# Patient Record
Sex: FEMALE | Race: WHITE | NOT HISPANIC OR LATINO | Employment: OTHER | URBAN - NONMETROPOLITAN AREA
[De-identification: names, ages, dates, MRNs, and addresses within clinical notes are randomized per-mention and may not be internally consistent; named-entity substitution may affect disease eponyms.]

---

## 2017-05-22 ENCOUNTER — HOSPITAL ENCOUNTER (EMERGENCY)
Facility: HOSPITAL | Age: 62
Discharge: HOME | End: 2017-05-22
Payer: COMMERCIAL

## 2017-05-22 VITALS
RESPIRATION RATE: 16 BRPM | OXYGEN SATURATION: 100 % | TEMPERATURE: 97.8 F | HEART RATE: 104 BPM | DIASTOLIC BLOOD PRESSURE: 87 MMHG | SYSTOLIC BLOOD PRESSURE: 138 MMHG

## 2017-05-22 DIAGNOSIS — L03.314 CELLULITIS OF GROIN, RIGHT: ICD-10-CM

## 2017-05-22 PROCEDURE — 99213 OFFICE O/P EST LOW 20 MIN: CPT

## 2017-05-22 RX ORDER — CEPHALEXIN 500 MG/1
500 CAPSULE ORAL 4 TIMES DAILY
Qty: 28 CAPSULE | Refills: 0 | Status: SHIPPED | OUTPATIENT
Start: 2017-05-22 | End: 2017-05-29

## 2017-05-22 ASSESSMENT — ENCOUNTER SYMPTOMS
SHORTNESS OF BREATH: 0
WOUND: 1
FEVER: 0
ABDOMINAL PAIN: 0
ROS SKIN COMMENTS: INSECT BITE

## 2017-05-22 NOTE — ED NOTES
"Patient presents with bug bite.  \"Possible spider.\"  Patient noticed yesterday and s/s of red, swollen and warm to the touch is spreading.  "

## 2017-05-22 NOTE — DISCHARGE INSTRUCTIONS
See attached for home care  Keep wound clean and dry  Cover bite laura with bacitracin ointment and band aid  Take all of antibiotic as prescribed  F/U with PCP if not improving or back to base line in 1 week  Return to  with worsening symptoms.         Discharge Instructions for Cellulitis  You have been diagnosed with cellulitis. This is an infection in the deepest layer of the skin. In some cases, the infection also affects the muscle. Cellulitis is caused by bacteria. The bacteria can enter the body through broken skin. This can happen with a cut, scratch, animal bite, or an insect bite that has been scratched. You may have been treated in the hospital with antibiotics and fluids. You will likely be given a prescription for antibiotics to take at home. This sheet will help you take care of yourself at home.  Home Care  When you are home:    Take the prescribed antibiotic medication you are given as directed until it is gone. Take it even if you feel better. It treats the infection and stops it from returning. Not taking all of the medication can make future infections hard to treat.    Keep the infected area clean.    When possible, raise the infected area above the level of your heart. This helps keep swelling down.    Talk to your doctor if you are in pain. Ask what kind of over-the-counter medication you can take for pain.    Apply clean bandages as advised.    Take your temperature once a day for a week.    Wash your hands often to prevent spreading the infection.  In the future, wash your hands before and after you touch cuts, scratches, or bandages. This will help prevent infection.   When to Call Your Doctor  Call your doctor immediately if you have any of the following:    Vomiting    Fever of100.4 F (38 C) or higher, or as directed by your health care provider    Shaking chills    Redness that gets worse in or around the infected area    Swelling of the infected area    Pain that gets worse in or  around the infected area    Difficulty or pain when moving the joints above or below the infected area    Discharge or pus draining from the area     6286-1450 [employer]. [address] [place] [address], [place] [address]. All rights reserved. This information is not intended as a substitute for professional medical care. Always follow your healthcare professional's instructions.

## 2017-05-22 NOTE — ED PROVIDER NOTES
"  History     Chief Complaint   Patient presents with     Insect Bite     HPI  Millie Gamez is a 62 year old female who presents to  for evaluation of insect bite. Pt states she noticed \"normal\" bite right lateral hip yesterday morning. She assumes spider but not sure. Erythema, induration worsened throughout the day, this morning spreading across groin and into lower abdomen. Denies fever, denies new arthralgias. Drainage noted at bite.     I have reviewed the Medications, Allergies, Past Medical and Surgical History, and Social History in the Epic system.    Review of Systems   Constitutional: Negative for fever.   Respiratory: Negative for shortness of breath.    Cardiovascular: Negative for chest pain.   Gastrointestinal: Negative for abdominal pain.   Skin: Positive for wound.        Insect bite   All other systems reviewed and are negative.      Physical Exam   BP: 138/87  Pulse: 104  Temp: 97.8  F (36.6  C)  Resp: 16  SpO2: 100 %  Physical Exam   Constitutional: She is oriented to person, place, and time. No distress.   HENT:   Head: Normocephalic.   Eyes: Conjunctivae are normal.   Neck: Normal range of motion.   Cardiovascular: Normal rate.    Pulmonary/Chest: Effort normal. No respiratory distress.   Abdominal: Soft. There is no tenderness.   Musculoskeletal: Normal range of motion.   Neurological: She is alert and oriented to person, place, and time.   Skin: Skin is warm and dry. She is not diaphoretic. There is erythema.        20 cm x 4 cm area of erythema, induration with center punctate draining yellow cloudy discharge.    Nursing note and vitals reviewed.      ED Course     Procedures      Assessments & Plan (with Medical Decision Making)   Pt presents with erythematous, indurated insect bite. Allergic reaction vs cellulitis. Given drainage at site, will treat with Keflex. Pt verbalizes understanding and agrees with plan.  Epic discharge instructions given. Pt discharged in stable condition. "     I have reviewed the nursing notes.    I have reviewed the findings, diagnosis, plan and need for follow up with the patient.    Discharge Medication List as of 5/22/2017 12:45 PM          Final diagnoses:   Cellulitis of groin, right     See attached for home care  Keep wound clean and dry  Cover bite laura with bacitracin ointment and band aid  Take all of antibiotic as prescribed  F/U with PCP if not improving or back to base line in 1 week  Return to  with worsening symptoms.     5/22/2017   [facility]     Elina Menezes APRN FNP  05/22/17 8908

## 2017-05-22 NOTE — ED AVS SNAPSHOT
[facility]    [address] [address]    Phone:  [phone]                                       Millie Gamez   MRN: [id number]    Department:  [facility]   Date of Visit:  5/22/2017           Patient Information     Date Of Birth          1955        Your diagnoses for this visit were:     Cellulitis of groin, right        You were seen by Elina Menezes APRN FNP.      Follow-up Information     Follow up with Anastacio Calderon MD In 1 week.    Specialty:  Family Practice    Why:  if not improving or back to baseline    Contact information:    [facility]  [address] [place] [address] [phone]          Follow up with [facility].    Specialty:  EMERGENCY MEDICINE    Why:  As needed, If symptoms worsen    Contact information:    [address] [address] [phone]    Additional information:    From [facility]: Take [address]. Turn left at [address]. Turn left at the first stoplight on [address]. At the first stop sign, take a right onto [address]. Take a left into the parking lot and continue through until you reach the North enterance of the building.       From [place]: Take [address]. Take the [address] ramp towards [place]. Turn left onto [address]. Take a slight right onto [address]. Turn left at the first stoplight on [address]. At the first stop sign, take a right onto [address]. Take a left into the parking lot and continue through until you reach the North enterance of the building.       From [place]: Take [address]. Take a right at [address]. At the first stop sign, take a right onto [address]. Take a left into the parking lot and continue through until you reach the North enterance of the building.         Discharge Instructions       See attached for home care  Keep wound clean and  dry  Cover bite laura with bacitracin ointment and band aid  Take all of antibiotic as prescribed  F/U with PCP if not improving or back to base line in 1 week  Return to  with worsening symptoms.         Discharge Instructions for Cellulitis  You have been diagnosed with cellulitis. This is an infection in the deepest layer of the skin. In some cases, the infection also affects the muscle. Cellulitis is caused by bacteria. The bacteria can enter the body through broken skin. This can happen with a cut, scratch, animal bite, or an insect bite that has been scratched. You may have been treated in the hospital with antibiotics and fluids. You will likely be given a prescription for antibiotics to take at home. This sheet will help you take care of yourself at home.  Home Care  When you are home:    Take the prescribed antibiotic medication you are given as directed until it is gone. Take it even if you feel better. It treats the infection and stops it from returning. Not taking all of the medication can make future infections hard to treat.    Keep the infected area clean.    When possible, raise the infected area above the level of your heart. This helps keep swelling down.    Talk to your doctor if you are in pain. Ask what kind of over-the-counter medication you can take for pain.    Apply clean bandages as advised.    Take your temperature once a day for a week.    Wash your hands often to prevent spreading the infection.  In the future, wash your hands before and after you touch cuts, scratches, or bandages. This will help prevent infection.   When to Call Your Doctor  Call your doctor immediately if you have any of the following:    Vomiting    Fever of100.4 F (38 C) or higher, or as directed by your health care provider    Shaking chills    Redness that gets worse in or around the infected area    Swelling of the infected area    Pain that gets worse in or around the infected area    Difficulty or pain when  moving the joints above or below the infected area    Discharge or pus draining from the area     5349-1269 [employer]. [address] [place] [address], [place] [address]. All rights reserved. This information is not intended as a substitute for professional medical care. Always follow your healthcare professional's instructions.             Review of your medicines      CONTINUE these medicines which may have CHANGED, or have new prescriptions. If we are uncertain of the size of tablets/capsules you have at home, strength may be listed as something that might have changed.        Dose / Directions Last dose taken    cephALEXin 500 MG capsule   Commonly known as:  KEFLEX   Dose:  500 mg   What changed:    - medication strength  - how much to take  - when to take this   Quantity:  28 capsule        Take 1 capsule (500 mg) by mouth 4 times daily for 7 days   Refills:  0                Prescriptions were sent or printed at these locations (1 Prescription)                   [facility] [address] [place] [address], [place] - [address] AT [facility] [address]   [address], [place] [address]    Telephone:  [phone]   Fax:  [phone]   Hours:                  E-Prescribed (1 of 1)         cephALEXin (KEFLEX) 500 MG capsule                Orders Needing Specimen Collection     None      Pending Results     No orders found from 5/20/2017 to 5/23/2017.            Pending Culture Results     No orders found from 5/20/2017 to 5/23/2017.            Thank you for choosing [facility]       Thank you for choosing [facility] for your care. Our goal is always to provide you with excellent care. Hearing back from our patients is one way we can continue to improve our services. Please take a few minutes to complete the written survey that you may receive in the mail after you visit with us. Thank you!        [facility] Information     [employer] lets you send messages to your doctor, view your test results, renew your  "prescriptions, schedule appointments and more. To sign up, go to [email or web address] . Click on \"Log in\" on the left side of the screen, which will take you to the Welcome page. Then click on \"Sign up Now\" on the right side of the page.     You will be asked to enter the access code listed below, as well as some personal information. Please follow the directions to create your username and password.     Your access code is: [email or web address]  Expires: 2017 12:45 PM     Your access code will  in 90 days. If you need help or a new code, please call your [facility] clinic or [phone].        Care EveryWhere ID     This is your Care EveryWhere ID. This could be used by other organizations to access your [facility] medical records  [id number]        After Visit Summary       This is your record. Keep this with you and show to your community pharmacist(s) and doctor(s) at your next visit.                  "

## 2017-05-22 NOTE — ED AVS SNAPSHOT
[facility]    [address] [place] [address]    Phone:  [phone]                                       Millie Gamez   MRN: [id number]    Department:  [facility]   Date of Visit:  5/22/2017           After Visit Summary Signature Page     I have received my discharge instructions, and my questions have been answered. I have discussed any challenges I see with this plan with the nurse or doctor.    ..........................................................................................................................................  Patient/Patient Representative Signature      ..........................................................................................................................................  Patient Representative Print Name and Relationship to Patient    ..................................................               ................................................  Date                                            Time    ..........................................................................................................................................  Reviewed by Signature/Title    ...................................................              ..............................................  Date                                                            Time

## 2017-05-27 ENCOUNTER — HOSPITAL ENCOUNTER (EMERGENCY)
Facility: HOSPITAL | Age: 62
Discharge: HOME | End: 2017-05-27
Attending: PHYSICIAN ASSISTANT | Admitting: PHYSICIAN ASSISTANT
Payer: COMMERCIAL

## 2017-05-27 VITALS
RESPIRATION RATE: 18 BRPM | DIASTOLIC BLOOD PRESSURE: 80 MMHG | TEMPERATURE: 97.7 F | SYSTOLIC BLOOD PRESSURE: 151 MMHG | OXYGEN SATURATION: 97 %

## 2017-05-27 DIAGNOSIS — S52.502A CLOSED FRACTURE OF DISTAL END OF LEFT RADIUS, UNSPECIFIED FRACTURE MORPHOLOGY, INITIAL ENCOUNTER: ICD-10-CM

## 2017-05-27 PROCEDURE — 29125 APPL SHORT ARM SPLINT STATIC: CPT

## 2017-05-27 PROCEDURE — 73110 X-RAY EXAM OF WRIST: CPT | Mod: TC,LT

## 2017-05-27 PROCEDURE — 40000268 ZZH STATISTIC NO CHARGES

## 2017-05-27 PROCEDURE — 29125 APPL SHORT ARM SPLINT STATIC: CPT | Performed by: PHYSICIAN ASSISTANT

## 2017-05-27 PROCEDURE — 99213 OFFICE O/P EST LOW 20 MIN: CPT

## 2017-05-27 RX ORDER — TRAMADOL HYDROCHLORIDE 50 MG/1
TABLET ORAL
Qty: 8 TABLET | Refills: 0 | Status: SHIPPED | OUTPATIENT
Start: 2017-05-27 | End: 2017-07-11

## 2017-05-27 ASSESSMENT — ENCOUNTER SYMPTOMS
JOINT SWELLING: 1
CONSTITUTIONAL NEGATIVE: 1
CARDIOVASCULAR NEGATIVE: 1
NEUROLOGICAL NEGATIVE: 1
PSYCHIATRIC NEGATIVE: 1
ARTHRALGIAS: 1

## 2017-05-27 NOTE — ED AVS SNAPSHOT
[facility]    [address] [address]    Phone:  [phone]                                       Millie Gamez   MRN: [id number]    Department:  [facility]   Date of Visit:  5/27/2017           Patient Information     Date Of Birth          1955        Your diagnoses for this visit were:     Closed fracture of distal end of left radius, unspecified fracture morphology, initial encounter        You were seen by Aleida Novak PA.      Follow-up Information     Follow up with Anastacio Calderon MD.    Specialty:  Family Practice    Why:  If symptoms worsen, prior to your Reevaluation at Orthopedics    Contact information:    [facility]  [address] [place] [id number] [phone]          Follow up with [facility].    Specialty:  EMERGENCY MEDICINE    Why:  If further concerns develop    Contact information:    [address] [place] [address] [phone]    Additional information:    From [facility]: Take [address]. Turn left at [address]. Turn left at the first stoplight on [address]. At the first stop sign, take a right onto [address]. Take a left into the parking lot and continue through until you reach the North enterance of the building.       From [place]: Take [address]. Take the [address] ramp towards [place]. Turn left onto [address]. Take a slight right onto [address]. Turn left at the first stoplight on [address]. At the first stop sign, take a right onto [address]. Take a left into the parking lot and continue through until you reach the North enterance of the building.       From [place]: Take [address]. Take a right at [address]. At the first stop sign, take a right onto [address]. Take a left into the parking lot and continue through until you reach the North enterance of the building.       Discharge  References/Attachments     RADIUS AND ULNA FRACTURE, NO REDUCTION REQUIRED (ENGLISH)    FRACTURE, UPPER EXTREMITY (ENGLISH)    SLING (ENGLISH)      ED Discharge Orders     ORTHOPEDICS ADULT REFERRAL       Your provider has referred you to: {ORTHOPEDICS ADULT REGION    Please be aware that coverage of these services is subject to the terms and limitations of your health insurance plan.  Call member services at your health plan with any benefit or coverage questions.      Please bring the following to your appointment:    >>   Any x-rays, CTs or MRIs which have been performed.  Contact the facility where they were done to arrange for  prior to your scheduled appointment.    >>   List of current medications   >>   This referral request   >>   Any documents/labs given to you for this referral                     Review of your medicines      START taking        Dose / Directions Last dose taken    traMADol 50 MG tablet   Commonly known as:  ULTRAM   Quantity:  8 tablet        Take half to one tablet every 8 hours as needed for pain   Refills:  0          Our records show that you are taking the medicines listed below. If these are incorrect, please call your family doctor or clinic.        Dose / Directions Last dose taken    cephALEXin 500 MG capsule   Commonly known as:  KEFLEX   Dose:  500 mg   Quantity:  28 capsule        Take 1 capsule (500 mg) by mouth 4 times daily for 7 days   Refills:  0                Prescriptions were sent or printed at these locations (1 Prescription)                   [facility] [id number]  [facility] [address] AT [facility] [address] [place] [address]    Telephone:  [phone]   Fax:  [phone]   Hours:                  Printed at Department/Unit printer (1 of 1)         traMADol (ULTRAM) 50 MG tablet                Procedures and tests performed during your visit     Wrist XR, G/E 3 views, left      Orders Needing Specimen Collection      "None      Pending Results     Date and Time Order Name Status Description    2017 1246 Wrist XR, G/E 3 views, left In process             Pending Culture Results     No orders found from 2017 to 2017.            Thank you for choosing [facility]       Thank you for choosing [facility] for your care. Our goal is always to provide you with excellent care. Hearing back from our patients is one way we can continue to improve our services. Please take a few minutes to complete the written survey that you may receive in the mail after you visit with us. Thank you!        [employer] Information     [employer] lets you send messages to your doctor, view your test results, renew your prescriptions, schedule appointments and more. To sign up, go to [email or web address] . Click on \"Log in\" on the left side of the screen, which will take you to the Welcome page. Then click on \"Sign up Now\" on the right side of the page.     You will be asked to enter the access code listed below, as well as some personal information. Please follow the directions to create your username and password.     Your access code is: [email or web address]  Expires: 2017 12:45 PM     Your access code will  in 90 days. If you need help or a new code, please call your [facility] clinic or [phone].        Care EveryWhere ID     This is your Care EveryWhere ID. This could be used by other organizations to access your [facility] medical records  [id number]        After Visit Summary       This is your record. Keep this with you and show to your community pharmacist(s) and doctor(s) at your next visit.                  "

## 2017-05-27 NOTE — ED AVS SNAPSHOT
[facility]    [address] [place] [address]    Phone:  [phone]                                       Millie Gamez   MRN: [id number]    Department:  [facility]   Date of Visit:  5/27/2017           After Visit Summary Signature Page     I have received my discharge instructions, and my questions have been answered. I have discussed any challenges I see with this plan with the nurse or doctor.    ..........................................................................................................................................  Patient/Patient Representative Signature      ..........................................................................................................................................  Patient Representative Print Name and Relationship to Patient    ..................................................               ................................................  Date                                            Time    ..........................................................................................................................................  Reviewed by Signature/Title    ...................................................              ..............................................  Date                                                            Time

## 2017-05-27 NOTE — ED PROVIDER NOTES
History     Chief Complaint   Patient presents with     Wrist Pain     lt wrist pain, notes fell last night     The history is provided by the patient.     Millie Gamez is a 62 year old female who states she fell last night with her hands out in front of her. Has left wrist pain/swelling. Denies any other injury at this time.     Allergies as of 05/27/2017     (No Known Allergies)     Discharge Medication List as of 5/27/2017  1:31 PM      START taking these medications    Details   traMADol (ULTRAM) 50 MG tablet Take half to one tablet every 8 hours as needed for pain, Disp-8 tablet, R-0, Local Print         CONTINUE these medications which have NOT CHANGED    Details   cephALEXin (KEFLEX) 500 MG capsule Take 1 capsule (500 mg) by mouth 4 times daily for 7 days, Disp-28 capsule, R-0, E-Prescribe           History reviewed. No pertinent past medical history.  Past Surgical History:   Procedure Laterality Date     COLONOSCOPY N/A 10/15/2015    Procedure: COLONOSCOPY;  Surgeon: Murray Calderon MD;  Location: [facility]     No family history on file.  Social History     Social History     Marital status:      Spouse name: N/A     Number of children: N/A     Years of education: N/A     Social History Main Topics     Smoking status: None     Smokeless tobacco: None     Alcohol use None     Drug use: None     Sexual activity: Not Asked     Other Topics Concern     None     Social History Narrative         I have reviewed the Medications, Allergies, Past Medical and Surgical History, and Social History in the Epic system.    Review of Systems   Constitutional: Negative.    HENT: Negative.    Cardiovascular: Negative.    Musculoskeletal: Positive for arthralgias and joint swelling.   Neurological: Negative.    Psychiatric/Behavioral: Negative.        Physical Exam   BP: 151/80  Heart Rate: 91  Temp: 97.7  F (36.5  C)  Resp: 18  SpO2: 97 %  Physical Exam   Constitutional: She is oriented to person, place, and  time. She appears well-developed and well-nourished. No distress.   Cardiovascular: Normal rate.    Pulmonary/Chest: Effort normal.   Musculoskeletal:   Left wrist: mild diffuse edema. No erythema/ecchymosis. Moderate radial side TTP. M/n/v intact. Pt guarding all ROM due to pain. 3/5 strength due to pain   Neurological: She is alert and oriented to person, place, and time.   Skin: She is not diaphoretic.   Psychiatric: She has a normal mood and affect.   Nursing note and vitals reviewed.      ED Course     ED Course     Orthopedic injury tx  Performed by: TELMA OWEN  Authorized by: TELMA OWEN   Consent: Verbal consent obtained.  Risks and benefits: risks, benefits and alternatives were discussed  Consent given by: patient  Patient identity confirmed: verbally with patient and provided demographic data  Injury location: wrist  Location details: left wrist  Injury type: fracture  Fracture type: distal radius  Pre-procedure neurovascular assessment: neurovascularly intact  Pre-procedure distal perfusion: normal  Pre-procedure neurological function: normal  Pre-procedure range of motion: reduced  Manipulation performed: no  Immobilization: splint and sling  Splint type: sugar tong  Supplies used: Ortho-Glass (with cottom sleeve, cotton padding, ace wrap and sling)  Post-procedure neurovascular assessment: post-procedure neurovascularly intact  Post-procedure distal perfusion: normal  Post-procedure neurological function: normal  Post-procedure range of motion: unchanged  Patient tolerance: Patient tolerated the procedure well with no immediate complications         Left wrist xray: vertical fracture in the distal radius, nondisplaced, pending official rad results            Assessments & Plan (with Medical Decision Making)     I have reviewed the nursing notes.    I have reviewed the findings, diagnosis, plan and need for follow up with the patient.    Discharge Medication List as of 5/27/2017  1:31 PM       START taking these medications    Details   traMADol (ULTRAM) 50 MG tablet Take half to one tablet every 8 hours as needed for pain, Disp-8 tablet, R-0, Local Print             Final diagnoses:   Closed fracture of distal end of left radius, unspecified fracture morphology, initial encounter           I discussed my tx plan with Dr. Blair. He agrees with my tx plan. Thank you for your assistance.  Patient verbally educated and given appropriate education sheets for the diagnoses and has no questions.  Follow up with your Primary Care provider if symptoms increase prior to your Orthopedic evaluation.  or if concerns develop, return to the ER  Aleida Novak Certified  Physician Assistant  5/27/2017  8:29 PM  URGENT CARE CLINIC      5/27/2017   [facility]     Aleida Novak PA  05/27/17 [phone]

## 2017-06-05 ENCOUNTER — OFFICE VISIT (OUTPATIENT)
Dept: ORTHOPEDICS | Facility: OTHER | Age: 62
End: 2017-06-05
Attending: ORTHOPAEDIC SURGERY
Payer: COMMERCIAL

## 2017-06-05 VITALS
HEART RATE: 101 BPM | HEIGHT: 66 IN | BODY MASS INDEX: 20.89 KG/M2 | SYSTOLIC BLOOD PRESSURE: 140 MMHG | WEIGHT: 130 LBS | TEMPERATURE: 99.1 F | OXYGEN SATURATION: 98 % | DIASTOLIC BLOOD PRESSURE: 78 MMHG

## 2017-06-05 DIAGNOSIS — S52.502A CLOSED FRACTURE OF DISTAL END OF LEFT RADIUS, UNSPECIFIED FRACTURE MORPHOLOGY, INITIAL ENCOUNTER: ICD-10-CM

## 2017-06-05 PROCEDURE — 25600 CLTX DST RDL FX/EPHYS SEP WO: CPT | Mod: LT

## 2017-06-05 PROCEDURE — 25600 CLTX DST RDL FX/EPHYS SEP WO: CPT | Mod: LT | Performed by: ORTHOPAEDIC SURGERY

## 2017-06-05 PROCEDURE — 99202 OFFICE O/P NEW SF 15 MIN: CPT | Mod: 57 | Performed by: ORTHOPAEDIC SURGERY

## 2017-06-05 PROCEDURE — 99212 OFFICE O/P EST SF 10 MIN: CPT

## 2017-06-05 RX ORDER — ACETAMINOPHEN 160 MG
2000 TABLET,DISINTEGRATING ORAL 2 TIMES DAILY
COMMUNITY

## 2017-06-05 RX ORDER — RIZATRIPTAN BENZOATE 10 MG/1
10 TABLET, ORALLY DISINTEGRATING ORAL
COMMUNITY
Start: 2016-09-30

## 2017-06-05 RX ORDER — CICLOPIROX 80 MG/ML
SOLUTION TOPICAL
COMMUNITY
Start: 2016-08-02

## 2017-06-05 RX ORDER — MULTIVITAMIN
CAPSULE ORAL
COMMUNITY

## 2017-06-05 ASSESSMENT — PAIN SCALES - GENERAL: PAINLEVEL: MILD PAIN (2)

## 2017-06-05 NOTE — MR AVS SNAPSHOT
"              After Visit Summary   2017    Millie Gamez    MRN: [id number]           Patient Information     Date Of Birth          1955        Visit Information        Provider Department      2017 2:00 PM Robert Lovell, DO  [facility]        Today's Diagnoses     Closed fracture of distal end of left radius, unspecified fracture morphology, initial encounter           Follow-ups after your visit        Who to contact     If you have questions or need follow up information about today's clinic visit or your schedule please contact  [facility]TH[facility] directly at [phone].  Normal or non-critical lab and imaging results will be communicated to you by [employer], letter or phone within 4 business days after the clinic has received the results. If you do not hear from us within 7 days, please contact the clinic through [employer] or phone. If you have a critical or abnormal lab result, we will notify you by phone as soon as possible.  Submit refill requests through Chat[employer] or call your pharmacy and they will forward the refill request to us. Please allow 3 business days for your refill to be completed.          Additional Information About Your Visit        MyChart Information     Chat[employer] lets you send messages to your doctor, view your test results, renew your prescriptions, schedule appointments and more. To sign up, go to [email or web address] . Click on \"Log in\" on the left side of the screen, which will take you to the Welcome page. Then click on \"Sign up Now\" on the right side of the page.     You will be asked to enter the access code listed below, as well as some personal information. Please follow the directions to create your username and password.     Your access code is: [email or web address]  Expires: 2017 12:45 PM     Your access code will  in 90 days. If you need help or a new code, please call your [place] clinic or [phone].        Care EveryWhere ID     This is your Care " "EveryWhere ID. This could be used by other organizations to access your [place] medical records  [id number]        Your Vitals Were     Pulse Temperature Height Pulse Oximetry BMI (Body Mass Index)       101 99.1  F (37.3  C) (Tympanic) 5' 6\" (1.676 m) 98% 20.98 kg/m2        Blood Pressure from Last 3 Encounters:   06/05/17 140/78   05/27/17 151/80   05/22/17 138/87    Weight from Last 3 Encounters:   06/05/17 130 lb (59 kg)              Today, you had the following     No orders found for display       Primary Care Provider Office Phone # Fax #    Anastacio Calderon [phone]       [place] [address] [place] [address]        Thank you!     Thank you for choosing  [facility]  for your care. Our goal is always to provide you with excellent care. Hearing back from our patients is one way we can continue to improve our services. Please take a few minutes to complete the written survey that you may receive in the mail after your visit with us. Thank you!             Your Updated Medication List - Protect others around you: Learn how to safely use, store and throw away your medicines at www.disposemymeds.org.          This list is accurate as of: 6/5/17  3:17 PM.  Always use your most recent med list.                   Brand Name Dispense Instructions for use    cephalexin 250 MG capsule    KEFLEX     TAKE 1 CAPSULE BY MOUTH EVERY DAY       ciclopirox 8 % Soln          denosumab 60 MG/ML Soln injection    PROLIA     Future order       Efinaconazole 10 % Soln      Apply 1 drop topically       MULTIvitamin  S Caps          rizatriptan 10 MG ODT tab    MAXALT-MLT     Take 10 mg by mouth       traMADol 50 MG tablet    ULTRAM    8 tablet    Take half to one tablet every 8 hours as needed for pain       vitamin D3 2000 UNITS Caps      Take 2,000 Units by mouth         "

## 2017-06-05 NOTE — NURSING NOTE
"Chief Complaint   Patient presents with     Musculoskeletal Problem     closed fracture of distal radius       Initial /78 (BP Location: Left arm, Patient Position: Chair, Cuff Size: Adult Regular)  Pulse 101  Temp 99.1  F (37.3  C) (Tympanic)  Ht 5' 6\" (1.676 m)  Wt 130 lb (59 kg)  SpO2 98%  BMI 20.98 kg/m2 Estimated body mass index is 20.98 kg/(m^2) as calculated from the following:    Height as of this encounter: 5' 6\" (1.676 m).    Weight as of this encounter: 130 lb (59 kg).  Medication Reconciliation: complete   Millie Sloan LPN      "

## 2017-06-05 NOTE — PROGRESS NOTES
Treatment of her left distal radius fracture.  The fracture was sustained approximately a week ago when she fell onto her outstretched wrist.  She was treated with a well-padded well molded sugar tong splint in the urgent care, and referred to orthopedics for definitive care.    Past medical history: She states she had a fracture of the same left distal radius and ulna when she was a child and is always have some residual deformity but always had full use of the wrist and hand.    Review of systems: Essentially unremarkable    Physical exam: Patient is an alert, oriented, very healthy-appearing woman in no apparent distress.  Her left arm is in a sugar tong splint.  The right upper extremity is completely normal full range of motion, normal neurovascular status normal strength.  The left upper extremity demonstrates swelling over the distal radius with slight amount of ecchymosis, sensation is intact motor exam demonstrates normal nerve function strength testing was not performed due to discomfort.  X-rays are reviewed and these demonstrated a comminuted intra-articular fracture of the distal radius with some displacement, but it is difficult to really assess as we do not have any x-rays of what her forearm looks like and how much of this deformity is new and how much of it is from her residual.    Assessment and plan the patient has a dye punch type lesion of her lunate fossa with some dorsal comminution.  There is considerable irregularity to the shape and anatomy of the distal radius.  Some of this appears to be old.  Treatment options included open reduction internal fixation, versus closed treatment.  The open reduction internal fixation would most likely have to be performed through a dorsal approach due to the residual deformity from her childhood fracture, of the volar aspect of the radius.  She has opted to proceed with closed treatment.  This will consist of a short arm cast for proximally another 4-6  "weeks.  This will be followed by occupational therapy, if needed, to restore range of motion and organization.  A well-padded well molded short arm cast was applied today and she will follow up in 2 weeks for repeat exam and x-ray or when necessary any difficulties in the interim./78 (BP Location: Left arm, Patient Position: Chair, Cuff Size: Adult Regular)  Pulse 101  Temp 99.1  F (37.3  C) (Tympanic)  Ht 5' 6\" (1.676 m)  Wt 130 lb (59 kg)  SpO2 98%  BMI 20.98 kg/m2  "

## 2017-06-13 DIAGNOSIS — S62.102A LEFT WRIST FRACTURE: Primary | ICD-10-CM

## 2017-06-21 ENCOUNTER — OFFICE VISIT (OUTPATIENT)
Dept: ORTHOPEDICS | Facility: OTHER | Age: 62
End: 2017-06-21
Attending: PHYSICIAN ASSISTANT
Payer: COMMERCIAL

## 2017-06-21 VITALS
BODY MASS INDEX: 20.89 KG/M2 | DIASTOLIC BLOOD PRESSURE: 66 MMHG | SYSTOLIC BLOOD PRESSURE: 104 MMHG | WEIGHT: 130 LBS | HEART RATE: 83 BPM | TEMPERATURE: 99.1 F | HEIGHT: 66 IN | OXYGEN SATURATION: 98 %

## 2017-06-21 DIAGNOSIS — S52.502D CLOSED FRACTURE OF DISTAL END OF LEFT RADIUS WITH ROUTINE HEALING, UNSPECIFIED FRACTURE MORPHOLOGY, SUBSEQUENT ENCOUNTER: Primary | ICD-10-CM

## 2017-06-21 PROCEDURE — 99212 OFFICE O/P EST SF 10 MIN: CPT

## 2017-06-21 PROCEDURE — 99207 ZZC FRACTURE CARE IN GLOBAL PERIOD: CPT | Performed by: PHYSICIAN ASSISTANT

## 2017-06-21 PROCEDURE — 73110 X-RAY EXAM OF WRIST: CPT | Mod: TC,LT

## 2017-06-21 RX ORDER — DICLOFENAC SODIUM 75 MG/1
75 TABLET, DELAYED RELEASE ORAL
COMMUNITY
Start: 2017-06-15 | End: 2017-09-22

## 2017-06-21 RX ORDER — SULFAMETHOXAZOLE/TRIMETHOPRIM 800-160 MG
TABLET ORAL
COMMUNITY
Start: 2017-06-15 | End: 2017-06-22

## 2017-06-21 ASSESSMENT — PAIN SCALES - GENERAL: PAINLEVEL: NO PAIN (0)

## 2017-06-21 NOTE — NURSING NOTE
"Chief Complaint   Patient presents with     RECHECK     Follow up left wrist fracture and new x-rays.       Initial /66 (BP Location: Left arm, Patient Position: Sitting, Cuff Size: Adult Regular)  Pulse 83  Temp 99.1  F (37.3  C) (Tympanic)  Ht 5' 6\" (1.676 m)  Wt 130 lb (59 kg)  SpO2 98%  BMI 20.98 kg/m2 Estimated body mass index is 20.98 kg/(m^2) as calculated from the following:    Height as of this encounter: 5' 6\" (1.676 m).    Weight as of this encounter: 130 lb (59 kg).  Medication Reconciliation: complete   Millie Sloan LPN      "

## 2017-06-21 NOTE — MR AVS SNAPSHOT
"              After Visit Summary   6/21/2017    Millie Gamez    MRN: [id number]           Patient Information     Date Of Birth          1955        Visit Information        Provider Department      6/21/2017 11:30 AM Pawel Haines PA-C  ORTH[facility]ED[facility]        Today's Diagnoses     Closed fracture of distal end of left radius with routine healing, unspecified fracture morphology, subsequent encounter    -  1      Care Instructions    Continue cast cares.  Follow up on 7/11/17 for recheck with cast off and repeat x-rays of the left wrist.          Follow-ups after your visit        Follow-up notes from your care team     Return in about 3 weeks (around 7/11/2017) for recheck left wrist, cast off then new x-rays.      Who to contact     If you have questions or need follow up information about today's clinic visit or your schedule please contact  ORTHOPEDICS directly at [phone].  Normal or non-critical lab and imaging results will be communicated to you by [employer], letter or phone within 4 business days after the clinic has received the results. If you do not hear from us within 7 days, please contact the clinic through [employer] or phone. If you have a critical or abnormal lab result, we will notify you by phone as soon as possible.  Submit refill requests through [employer] or call your pharmacy and they will forward the refill request to us. Please allow 3 business days for your refill to be completed.          Additional Information About Your Visit        MyChart Information     [employer] lets you send messages to your doctor, view your test results, renew your prescriptions, schedule appointments and more. To sign up, go to [email or web address] . Click on \"Log in\" on the left side of the screen, which will take you to the Welcome page. Then click on \"Sign up Now\" on the right side of the page.     You will be asked to enter the access code listed below, as well as some personal information. " "Please follow the directions to create your username and password.     Your access code is: [email or web address]  Expires: 2017 12:45 PM     Your access code will  in 90 days. If you need help or a new code, please call your [place] clinic or [phone].        Care EveryWhere ID     This is your Care EveryWhere ID. This could be used by other organizations to access your [place] medical records  [id number]        Your Vitals Were     Pulse Temperature Height Pulse Oximetry BMI (Body Mass Index)       83 99.1  F (37.3  C) (Tympanic) 5' 6\" (1.676 m) 98% 20.98 kg/m2        Blood Pressure from Last 3 Encounters:   17 104/66   17 140/78   17 151/80    Weight from Last 3 Encounters:   17 130 lb (59 kg)   17 130 lb (59 kg)               Primary Care Provider Office Phone # Fax #    Anastacio Calderon [phone]       [facility] [address] [place] [address]        Equal Access to Services     [facility] AH: Hadii aad ku hadasho Soanalisaali, waaxda luqadaha, qaybta kaalmada adebernardayada, giorgio [place]en[place] . So [facility] [phone].    ATENCIÓN: Si habla español, tiene a hamilton disposición servicios gratuitos de asistencia lingüística. Llame al [phone].    We comply with applicable federal civil rights laws and [place] laws. We do not discriminate on the basis of race, color, national origin, age, disability sex, sexual orientation or gender identity.            Thank you!     Thank you for choosing  [facility]  for your care. Our goal is always to provide you with excellent care. Hearing back from our patients is one way we can continue to improve our services. Please take a few minutes to complete the written survey that you may receive in the mail after your visit with us. Thank you!             Your Updated Medication List - Protect others around you: Learn how to safely use, store and throw away your medicines at [email or web address]. "          This list is accurate as of: 6/21/17 11:34 AM.  Always use your most recent med list.                   Brand Name Dispense Instructions for use Diagnosis    cephalexin 250 MG capsule    KEFLEX     TAKE 1 CAPSULE BY MOUTH EVERY DAY        ciclopirox 8 % Soln           denosumab 60 MG/ML Soln injection    PROLIA     Future order        diclofenac 75 MG EC tablet    VOLTAREN     Take 75 mg by mouth        Efinaconazole 10 % Soln      Apply 1 drop topically        MULTIvitamin  S Caps           rizatriptan 10 MG ODT tab    MAXALT-MLT     Take 10 mg by mouth        sulfamethoxazole-trimethoprim 800-160 MG per tablet    BACTRIM DS/SEPTRA DS          traMADol 50 MG tablet    ULTRAM    8 tablet    Take half to one tablet every 8 hours as needed for pain        vitamin D3 2000 UNITS Caps      Take 2,000 Units by mouth

## 2017-06-21 NOTE — PROGRESS NOTES
"CHIEF COMPLAINT:  Recheck displaced intra-articular left distal radius fracture, DOI 5/27/17    SUBJECTIVE:  Millie Gamez is a 62 year old female who is here today for follow up of the above noted fracture on DOI noted.  I am seeing her in Dr. Lovell's absence.  She is now 3 1/2 weeks out from injury.  She is here today for serial x-ray check in the cast.  She has tolerated her cast well, kept it clean and dry.  She has essentially no discomfort.  Patient denies numbness or tingling.  Patient denies other new events or injuries.      OBJECTIVE:   Blood pressure 104/66, pulse 83, temperature 99.1  F (37.3  C), temperature source Tympanic, height 5' 6\" (1.676 m), weight 130 lb (59 kg), SpO2 98 %.   Upon inspection, cast is in fair repair.  Skin appears clean dry and intact surrounding the cast.  No appreciable digital edema.  Digital ROM full.  Distal neurovascular status intact.      IMAGING:  New x-rays of the left wrist were obtained today, interpreted and reviewed.  There has been no interval change in overall alignment/displacement/angulation when compared to previous films on 5/27/17.  There appears to be early healing response however cast obscures fine detail.  No other obvious bony or soft tissue abnormalities.    ASSESSMENT:   Stable recheck displaced intra-articular left distal radius fracture, DOI 5/27/17    PLAN:   Will continue current plan of care.  She will stay in cast until 6 weeks from DOI.  Cast cares again discussed.  She will follow up on 7/11/17 for recheck with cast off and new x-rays.  Patient will return sooner if worsening pain, other issues or concerns.      Pawel Haines PA-C        "

## 2017-06-21 NOTE — PATIENT INSTRUCTIONS
Continue cast cares.  Follow up on 7/11/17 for recheck with cast off and repeat x-rays of the left wrist.

## 2017-07-11 ENCOUNTER — APPOINTMENT (OUTPATIENT)
Dept: GENERAL RADIOLOGY | Facility: OTHER | Age: 62
End: 2017-07-11
Attending: PHYSICIAN ASSISTANT
Payer: COMMERCIAL

## 2017-07-11 ENCOUNTER — OFFICE VISIT (OUTPATIENT)
Dept: ORTHOPEDICS | Facility: OTHER | Age: 62
End: 2017-07-11
Attending: PHYSICIAN ASSISTANT
Payer: COMMERCIAL

## 2017-07-11 VITALS
OXYGEN SATURATION: 98 % | HEART RATE: 98 BPM | DIASTOLIC BLOOD PRESSURE: 78 MMHG | HEIGHT: 66 IN | TEMPERATURE: 98.5 F | SYSTOLIC BLOOD PRESSURE: 128 MMHG | BODY MASS INDEX: 20.89 KG/M2 | WEIGHT: 130 LBS

## 2017-07-11 DIAGNOSIS — S52.502D CLOSED FRACTURE OF DISTAL END OF LEFT RADIUS WITH ROUTINE HEALING, UNSPECIFIED FRACTURE MORPHOLOGY, SUBSEQUENT ENCOUNTER: Primary | ICD-10-CM

## 2017-07-11 PROCEDURE — 99212 OFFICE O/P EST SF 10 MIN: CPT

## 2017-07-11 PROCEDURE — 73100 X-RAY EXAM OF WRIST: CPT | Mod: TC,LT | Performed by: RADIOLOGY

## 2017-07-11 PROCEDURE — 73100 X-RAY EXAM OF WRIST: CPT | Mod: TC,LT

## 2017-07-11 PROCEDURE — 99207 ZZC FRACTURE CARE IN GLOBAL PERIOD: CPT | Performed by: PHYSICIAN ASSISTANT

## 2017-07-11 ASSESSMENT — PAIN SCALES - GENERAL: PAINLEVEL: NO PAIN (0)

## 2017-07-11 NOTE — NURSING NOTE
"Chief Complaint   Patient presents with     RECHECK     Cast off and follow up left wrist fracture.       Initial /78 (BP Location: Left arm, Patient Position: Sitting, Cuff Size: Adult Regular)  Pulse 98  Temp 98.5  F (36.9  C) (Tympanic)  Ht 5' 6\" (1.676 m)  Wt 130 lb (59 kg)  SpO2 98%  BMI 20.98 kg/m2 Estimated body mass index is 20.98 kg/(m^2) as calculated from the following:    Height as of this encounter: 5' 6\" (1.676 m).    Weight as of this encounter: 130 lb (59 kg).  Medication Reconciliation: complete   Millie Sloan LPN      "

## 2017-07-11 NOTE — PROGRESS NOTES
"CHIEF COMPLAINT:  Recheck displaced intra-articular left distal radius fracture, DOI 5/27/17     SUBJECTIVE:  Millie Gamez is a 62 year old female who is here today for follow up of the above noted fracture on DOI noted.  She is now 6 1/2 weeks out from injury.  She is here today for routine recheck.  She has tolerated her cast well, kept it clean and dry.  She has essentially no discomfort while in cast.  She is having some numbness and tingling in a median nerve distribution which she also had pre-injury.  Patient denies other new events or injuries.       OBJECTIVE:   /78 (BP Location: Left arm, Patient Position: Sitting, Cuff Size: Adult Regular)  Pulse 98  Temp 98.5  F (36.9  C) (Tympanic)  Ht 5' 6\" (1.676 m)  Wt 130 lb (59 kg)  SpO2 98%  BMI 20.98 kg/m2  Upon inspection, cast is in fair repair.  Cast removed.  Skin appears clean dry and intact throughout forearm wrist and hand.  No appreciable edema.  She is nontender along the distal radius and ulna today.  Wrist ROM limited as expected.  Digital ROM full.  Negative Tinel's.  Equivocal compression test.  Distal neurovascular status intact.       IMAGING:  New x-rays of the left wrist were obtained today, interpreted and reviewed.  There has been no interval change in overall alignment/displacement/angulation when compared to previous films on 6/21/17.  There has been progressive healing response with sclerosis and callus noted.  No other obvious bony or soft tissue abnormalities.     ASSESSMENT:   Stable recheck healing displaced intra-articular left distal radius fracture, DOI 5/27/17.  Question underlying CTS.     PLAN:   Will discontinue immobilization and allow her to begin gentle ROM exercises.  She may slowly resume use of the left arm as tolerated.  Did recommend against any heavy lifting or resisted use for another few weeks.  Advised she follow up in 4 weeks for recheck, sooner if worsening pain, other issues or concerns.  No x-rays " needed at next visit unless patient having discomfort.  All questions answered.        Pawel Haines PA-C

## 2017-07-11 NOTE — PATIENT INSTRUCTIONS
Begin ROM exercises as discussed.  May slowly resume use with left arm as you feel comfortable.  No heavy lifting or resisted use for another few weeks.    Follow up in 4 weeks for recheck.

## 2017-07-11 NOTE — MR AVS SNAPSHOT
"              After Visit Summary   7/11/2017    Millie Gamez    MRN: [id number]           Patient Information     Date Of Birth          1955        Visit Information        Provider Department      7/11/2017 11:30 AM Pawel Haines PA-C  ORTH[facility]EDICS        Today's Diagnoses     Closed fracture of distal end of left radius with routine healing, unspecified fracture morphology, subsequent encounter    -  1      Care Instructions    Begin ROM exercises as discussed.  May slowly resume use with left arm as you feel comfortable.  No heavy lifting or resisted use for another few weeks.    Follow up in 4 weeks for recheck.            Follow-ups after your visit        Follow-up notes from your care team     Return in about 4 weeks (around 8/8/2017) for recheck left wrist.      Who to contact     If you have questions or need follow up information about today's clinic visit or your schedule please contact  ORTHOPEDICS directly at [phone].  Normal or non-critical lab and imaging results will be communicated to you by [employer], letter or phone within 4 business days after the clinic has received the results. If you do not hear from us within 7 days, please contact the clinic through [employer] or phone. If you have a critical or abnormal lab result, we will notify you by phone as soon as possible.  Submit refill requests through [facility] or call your pharmacy and they will forward the refill request to us. Please allow 3 business days for your refill to be completed.          Additional Information About Your Visit        [employer] Information     [facility] lets you send messages to your doctor, view your test results, renew your prescriptions, schedule appointments and more. To sign up, go to [email or web address] . Click on \"Log in\" on the left side of the screen, which will take you to the Welcome page. Then click on \"Sign up Now\" on the right side of the page.     You will be asked to enter the access " "code listed below, as well as some personal information. Please follow the directions to create your username and password.     Your access code is: [email or web address]  Expires: 2017 12:45 PM     Your access code will  in 90 days. If you need help or a new code, please call your [facility] or [phone].        Care EveryWhere ID     This is your Care EveryWhere ID. This could be used by other organizations to access your [place] medical records  [id number]        Your Vitals Were     Pulse Temperature Height Pulse Oximetry BMI (Body Mass Index)       98 98.5  F (36.9  C) (Tympanic) 5' 6\" (1.676 m) 98% 20.98 kg/m2        Blood Pressure from Last 3 Encounters:   17 128/78   17 104/66   17 140/78    Weight from Last 3 Encounters:   17 130 lb (59 kg)   17 130 lb (59 kg)   17 130 lb (59 kg)              Today, you had the following     No orders found for display       Primary Care Provider Office Phone # Fax #    Anastacio Calderon [phone]       [facility] [address] [place] [address]        Equal Access to Services     THOMAS [facility] HospitalFRANCISCA : Hadii cassidy plasenciao Soanalisaali, waaxda luqadaha, qaybta kaalmada adeegyada, giorgio montenegro . So [facility] [phone].    ATENCIÓN: Si habla español, tiene a hamilton disposición servicios gratuitos de asistencia lingüística. Llame al [phone].    We comply with applicable federal civil rights laws and [place] laws. We do not discriminate on the basis of race, color, national origin, age, disability sex, sexual orientation or gender identity.            Thank you!     Thank you for choosing  ORTHOPEDICS  for your care. Our goal is always to provide you with excellent care. Hearing back from our patients is one way we can continue to improve our services. Please take a few minutes to complete the written survey that you may receive in the mail after your visit with us. Thank you!      "        Your Updated Medication List - Protect others around you: Learn how to safely use, store and throw away your medicines at www.disposemymeds.org.          This list is accurate as of: 7/11/17 11:58 AM.  Always use your most recent med list.                   Brand Name Dispense Instructions for use Diagnosis    cephalexin 250 MG capsule    KEFLEX     TAKE 1 CAPSULE BY MOUTH EVERY DAY        ciclopirox 8 % Soln           denosumab 60 MG/ML Soln injection    PROLIA     Future order        diclofenac 75 MG EC tablet    VOLTAREN     Take 75 mg by mouth        MULTIvitamin  S Caps           rizatriptan 10 MG ODT tab    MAXALT-MLT     Take 10 mg by mouth        vitamin D3 2000 UNITS Caps      Take 2,000 Units by mouth

## 2017-08-08 ENCOUNTER — OFFICE VISIT (OUTPATIENT)
Dept: ORTHOPEDICS | Facility: OTHER | Age: 62
End: 2017-08-08
Attending: PHYSICIAN ASSISTANT
Payer: COMMERCIAL

## 2017-08-08 VITALS
SYSTOLIC BLOOD PRESSURE: 122 MMHG | HEIGHT: 66 IN | WEIGHT: 130 LBS | BODY MASS INDEX: 20.89 KG/M2 | TEMPERATURE: 99.2 F | HEART RATE: 102 BPM | OXYGEN SATURATION: 99 % | RESPIRATION RATE: 18 BRPM | DIASTOLIC BLOOD PRESSURE: 82 MMHG

## 2017-08-08 DIAGNOSIS — G56.02 CARPAL TUNNEL SYNDROME OF LEFT WRIST: ICD-10-CM

## 2017-08-08 DIAGNOSIS — S52.502D CLOSED FRACTURE OF DISTAL END OF LEFT RADIUS WITH ROUTINE HEALING, UNSPECIFIED FRACTURE MORPHOLOGY, SUBSEQUENT ENCOUNTER: Primary | ICD-10-CM

## 2017-08-08 PROCEDURE — 99212 OFFICE O/P EST SF 10 MIN: CPT

## 2017-08-08 PROCEDURE — 99212 OFFICE O/P EST SF 10 MIN: CPT | Performed by: PHYSICIAN ASSISTANT

## 2017-08-08 PROCEDURE — 99207 ZZC FRACTURE CARE IN GLOBAL PERIOD: CPT | Performed by: PHYSICIAN ASSISTANT

## 2017-08-08 ASSESSMENT — PAIN SCALES - GENERAL: PAINLEVEL: NO PAIN (0)

## 2017-08-08 NOTE — PATIENT INSTRUCTIONS
No restrictions with regard to wrist fracture at this time.    Will refer you to Dr. Garcia for LUE EMG/NCV to further assess carpal tunnel syndrome.    Follow up with Dr. Lovell after EMG performed.

## 2017-08-08 NOTE — PROGRESS NOTES
"Chief complaint:  1.  Recheck displaced intra-articular left distal radius fracture, DOI 5/27/17   2.  Worsening numbness, tingling and weakness left wrist/hand    Subjective:  Millie returns today for recheck of the above.  She is now over 10 weeks out from initial injury to the left wrist and subsequent fracture.  Since her last visit on 7/11/17, she has continued to do well. She has returned to normal activities without pain.  She does note worsening numbness and tingling in a median nerve distribution however.  She describes associated weakness as well and has difficulty grasping things and doing things such as putting bait on a hook.  Her symptoms seem to be somewhat worse at night.  In general, her symptoms are worse than when I saw her last.  She denies other new injuries or events.    Objective:   /82 (BP Location: Left arm, Patient Position: Sitting, Cuff Size: Adult Regular)  Pulse 102  Temp 99.2  F (37.3  C) (Tympanic)  Resp 18  Ht 5' 6\" (1.676 m)  Wt 130 lb (59 kg)  SpO2 99%  BMI 20.98 kg/m2  Skin remains clean dry and intact throughout the left forearm, wrist and hand.  There is no appreciable edema or ecchymosis.  Skin is normothermic to touch.  She is nontender once again today along the distal radius and ulna.  Wrist range of motion much improved and within functional limits.  Digital range of motion full as well.  She has positive Tinel sign today over the carpal tunnel.  She again has an equivocal compression test.  Negative Phalen's maneuver.  Inspection of the hand reveals atrophy of the thenar eminence compared to her contralateral hand.  She has slightly decreased  strength and pinch strength on the left.  She has slight decreased sensation to soft touch in the median nerve distribution today.  She has grossly intact sensation to soft touch in an ulnar and radial nerve distribution today.  She is 2+ radial pulse and good capillary refill.    Imaging studies: None " new.    Assessment:  1.  Healed left wrist displaced intra-articular distal radius fracture, DOI 5/27/17  2.  Left wrist carpal tunnel syndrome    Plan: I had a discussion today with patient in regards to her progress thus far as well as future treatment.  She appears to have healed nicely from her recent fracture.  Unfortunately she appears to have progressively worsening symptoms related to underlying carpal tunnel syndrome.  We discussed conservative measures including NSAIDs and use of a brace along with activity modifications although she is seeking more definitive treatment.  We discussed referral to neurology for consultation and EMG/NCV of the left upper extremity.  She will then follow up with Dr. Lovell to discuss these results and possible treatment.  In the interim, I did offer a brace from our closet today however after discussing the potential cost, she would like to get one over-the-counter.  She appeared to understand the type of splint recommended.  She will wear mostly at night.  All of her questions were otherwise answered to her satisfaction.    Pawel Haines PA-C

## 2017-08-08 NOTE — NURSING NOTE
"Chief Complaint   Patient presents with     Musculoskeletal Problem     left Wrist Fracture follow up        Initial /82 (BP Location: Left arm, Patient Position: Sitting, Cuff Size: Adult Regular)  Pulse 102  Temp 99.2  F (37.3  C) (Tympanic)  Resp 18  Ht 5' 6\" (1.676 m)  Wt 130 lb (59 kg)  SpO2 99%  BMI 20.98 kg/m2 Estimated body mass index is 20.98 kg/(m^2) as calculated from the following:    Height as of this encounter: 5' 6\" (1.676 m).    Weight as of this encounter: 130 lb (59 kg).  Medication Reconciliation: unable or not appropriate to perform   Janis Moss LPN      "

## 2017-08-08 NOTE — MR AVS SNAPSHOT
After Visit Summary   8/8/2017    Millie Gamez    MRN: [id number]           Patient Information     Date Of Birth          1955        Visit Information        Provider Department      8/8/2017 1:00 PM Pawel Haines PA-C  ORTHOPEDICS        Today's Diagnoses     Closed fracture of distal end of left radius with routine healing, unspecified fracture morphology, subsequent encounter    -  1    Carpal tunnel syndrome of left wrist          Care Instructions    No restrictions with regard to wrist fracture at this time.    Will refer you to Dr. Garcia for LUE EMG/NCV to further assess carpal tunnel syndrome.    Follow up with Dr. Lovell after EMG performed.          Follow-ups after your visit        Additional Services     NEUROLOGY ADULT REFERRAL       Your provider has referred you for the following:   Dr. Garcia for LUE EMG/NCV    Please be aware that coverage of these services is subject to the terms and limitations of your health insurance plan.  Call member services at your health plan with any benefit or coverage questions.      Please bring the following with you to your appointment:    (1) Any X-Rays, CTs or MRIs which have been performed.  Contact the facility where they were done to arrange for  prior to your scheduled appointment.    (2) List of current medications  (3) This referral request   (4) Any documents/labs given to you for this referral                  Who to contact     If you have questions or need follow up information about today's clinic visit or your schedule please contact  ORTHOPEDICS directly at [phone].  Normal or non-critical lab and imaging results will be communicated to you by MyChart, letter or phone within 4 business days after the clinic has received the results. If you do not hear from us within 7 days, please contact the clinic through MyChart or phone. If you have a critical or abnormal lab result, we will notify you by phone as soon  "as possible.  Submit refill requests through [employer] or call your pharmacy and they will forward the refill request to us. Please allow 3 business days for your refill to be completed.          Additional Information About Your Visit        [employer] Information     [employer] lets you send messages to your doctor, view your test results, renew your prescriptions, schedule appointments and more. To sign up, go to [email or web address] . Click on \"Log in\" on the left side of the screen, which will take you to the Welcome page. Then click on \"Sign up Now\" on the right side of the page.     You will be asked to enter the access code listed below, as well as some personal information. Please follow the directions to create your username and password.     Your access code is: [email or web address]  Expires: 2017 12:45 PM     Your access code will  in 90 days. If you need help or a new code, please call your [place] clinic or [phone].        Care EveryWhere ID     This is your Care EveryWhere ID. This could be used by other organizations to access your [place] medical records  [id number]        Your Vitals Were     Pulse Temperature Respirations Height Pulse Oximetry BMI (Body Mass Index)    102 99.2  F (37.3  C) (Tympanic) 18 5' 6\" (1.676 m) 99% 20.98 kg/m2       Blood Pressure from Last 3 Encounters:   17 122/82   17 128/78   17 104/66    Weight from Last 3 Encounters:   17 130 lb (59 kg)   17 130 lb (59 kg)   17 130 lb (59 kg)              We Performed the Following     NEUROLOGY ADULT REFERRAL        Primary Care Provider Office Phone # Fax #    Anastacio Calderon [phone]       [place] [address] [place] [address]        Equal Access to Services     THOMAS FLORES : Bairon Maddox, kori chun, qagiorgio gonsales . [facility] [phone].    ATENCIÓN: Si gino carter " disposición servicios gratuitos de asistencia lingüística. Guillaume kay [phone].    We comply with applicable federal civil rights laws and [place] laws. We do not discriminate on the basis of race, color, national origin, age, disability sex, sexual orientation or gender identity.            Thank you!     Thank you for choosing  ORTHOPEDICS  for your care. Our goal is always to provide you with excellent care. Hearing back from our patients is one way we can continue to improve our services. Please take a few minutes to complete the written survey that you may receive in the mail after your visit with us. Thank you!             Your Updated Medication List - Protect others around you: Learn how to safely use, store and throw away your medicines at www.disposemymeds.org.          This list is accurate as of: 8/8/17  1:19 PM.  Always use your most recent med list.                   Brand Name Dispense Instructions for use Diagnosis    cephalexin 250 MG capsule    KEFLEX     TAKE 1 CAPSULE BY MOUTH EVERY DAY        ciclopirox 8 % Soln           denosumab 60 MG/ML Soln injection    PROLIA     Future order        diclofenac 75 MG EC tablet    VOLTAREN     Take 75 mg by mouth        MULTIvitamin  S Caps           rizatriptan 10 MG ODT tab    MAXALT-MLT     Take 10 mg by mouth        vitamin D3 2000 UNITS Caps      Take 2,000 Units by mouth

## 2017-09-13 ENCOUNTER — TRANSFERRED RECORDS (OUTPATIENT)
Dept: HEALTH INFORMATION MANAGEMENT | Facility: HOSPITAL | Age: 62
End: 2017-09-13

## 2017-09-18 ENCOUNTER — OFFICE VISIT (OUTPATIENT)
Dept: ORTHOPEDICS | Facility: OTHER | Age: 62
End: 2017-09-18
Attending: ORTHOPAEDIC SURGERY
Payer: COMMERCIAL

## 2017-09-18 VITALS
RESPIRATION RATE: 18 BRPM | TEMPERATURE: 98.4 F | HEART RATE: 100 BPM | DIASTOLIC BLOOD PRESSURE: 92 MMHG | SYSTOLIC BLOOD PRESSURE: 130 MMHG | WEIGHT: 130 LBS | BODY MASS INDEX: 20.89 KG/M2 | OXYGEN SATURATION: 99 % | HEIGHT: 66 IN

## 2017-09-18 DIAGNOSIS — G56.02 CARPAL TUNNEL SYNDROME OF LEFT WRIST: Primary | ICD-10-CM

## 2017-09-18 PROCEDURE — 99213 OFFICE O/P EST LOW 20 MIN: CPT | Performed by: ORTHOPAEDIC SURGERY

## 2017-09-18 PROCEDURE — 99212 OFFICE O/P EST SF 10 MIN: CPT

## 2017-09-18 RX ORDER — NITROFURANTOIN MACROCRYSTALS 50 MG/1
50 CAPSULE ORAL AT BEDTIME
COMMUNITY
Start: 2017-08-30 | End: 2018-02-26

## 2017-09-18 ASSESSMENT — PAIN SCALES - GENERAL: PAINLEVEL: NO PAIN (0)

## 2017-09-18 NOTE — MR AVS SNAPSHOT
"              After Visit Summary   2017    Millie Gamez    MRN: [id number]           Patient Information     Date Of Birth          1955        Visit Information        Provider Department      2017 10:40 AM Robert Lovell DO  ORTHOPEDICS         Follow-ups after your visit        Your next 10 appointments already scheduled     Oct 12, 2017  9:40 AM CDT   (Arrive by 9:25 AM)   Post Op with Robert Lovell DO    ORTHOPEDICS ([facility] [place] )    [address]   [place] [address] [phone]              Who to contact     If you have questions or need follow up information about today's clinic visit or your schedule please contact  ORTHOPEDICS directly at [phone].  Normal or non-critical lab and imaging results will be communicated to you by MyChart, letter or phone within 4 business days after the clinic has received the results. If you do not hear from us within 7 days, please contact the clinic through MyChart or phone. If you have a critical or abnormal lab result, we will notify you by phone as soon as possible.  Submit refill requests through [employer] or call your pharmacy and they will forward the refill request to us. Please allow 3 business days for your refill to be completed.          Additional Information About Your Visit        [employer] Information     [employer] lets you send messages to your doctor, view your test results, renew your prescriptions, schedule appointments and more. To sign up, go to [email or web address] . Click on \"Log in\" on the left side of the screen, which will take you to the Welcome page. Then click on \"Sign up Now\" on the right side of the page.     You will be asked to enter the access code listed below, as well as some personal information. Please follow the directions to create your username and password.     Your access code is: [email or web address]  Expires: 2017 10:59 AM     Your access code will  in 90 days. If you " "need help or a new code, please call your [place] clinic or [phone].        Care EveryWhere ID     This is your Care EveryWhere ID. This could be used by other organizations to access your [place] medical records  [id number]        Your Vitals Were     Pulse Temperature Respirations Height Pulse Oximetry BMI (Body Mass Index)    100 98.4  F (36.9  C) (Tympanic) 18 5' 6\" (1.676 m) 99% 20.98 kg/m2       Blood Pressure from Last 3 Encounters:   09/18/17 (!) 130/92   08/08/17 122/82   07/11/17 128/78    Weight from Last 3 Encounters:   09/18/17 130 lb (59 kg)   08/08/17 130 lb (59 kg)   07/11/17 130 lb (59 kg)              Today, you had the following     No orders found for display       Primary Care Provider Office Phone # Fax #    Anastacio Calderon [phone]       [facility] [address] [place] [address]        Equal Access to Services     [facility]: Hadii cassidy ku hadasho Soomaali, waaxda luqadaha, qaybta kaalmada adeegyada, giorgio montenegro . So [facility] [phone].    ATENCIÓN: Si habla español, tiene a hamilton disposición servicios gratuitos de asistencia lingüística. Llame al [phone].    We comply with applicable federal civil rights laws and [place] laws. We do not discriminate on the basis of race, color, national origin, age, disability sex, sexual orientation or gender identity.            Thank you!     Thank you for choosing  ORTHOPEDICS  for your care. Our goal is always to provide you with excellent care. Hearing back from our patients is one way we can continue to improve our services. Please take a few minutes to complete the written survey that you may receive in the mail after your visit with us. Thank you!             Your Updated Medication List - Protect others around you: Learn how to safely use, store and throw away your medicines at www.disposemymeds.org.          This list is accurate as of: 9/18/17 10:59 AM.  Always use your " most recent med list.                   Brand Name Dispense Instructions for use Diagnosis    ciclopirox 8 % Soln           denosumab 60 MG/ML Soln injection    PROLIA     Future order        diclofenac 75 MG EC tablet    VOLTAREN     Take 75 mg by mouth        MULTIvitamin  S Caps           nitroFURantoin 50 MG capsule    MACRODANTIN     Take 50 mg by mouth        rizatriptan 10 MG ODT tab    MAXALT-MLT     Take 10 mg by mouth        vitamin D3 2000 UNITS Caps      Take 2,000 Units by mouth

## 2017-09-18 NOTE — PROGRESS NOTES
Patient presents today with a chief complaint of pain, tingling, paresthesias and numbness in her left nondominant hand median nerve distribution.  She had a fracture back in June of her distal radius and has had these symptoms ever since.  The fracture is healed uneventfully but the neuropathic symptoms have remained.  She did receive a evaluation from Dr. Garcia, a neurologist, who has diagnosed severe carpal tunnel syndrome, most likely posttraumatic    Past medical history: Noncontributory    Review of systems: Patient has no recent constitutional symptoms.    HEENT: Normal    Chest: Normal    Cardiovascular: Normal.    GI: Normal    : Normal    Musculoskeletal/neurologic: See present complaint    Physical exam: Patient is an alert, healthy-appearing female in no apparent distress.    HEENT: Full range of motion of cervical spine, midline trachea, negative Spurling's exam    Chest: Clear    Cardiovascular: Regular rate and rhythm, normal peripheral pulses, normal peripheral Chester's exam of the left wrist and hand.    GI: Normal    Musculoskeletal: The left wrist demonstrates a slight residual deformity from the fracture, skin is in good condition, Phalen's exam is positive, Tinel's at the wrist is positive, Chester's exam is negative.  There is no significant thenar atrophy.  Sensation is diminished in the median nerve distribution.    EMGs/NCV's: These demonstrate severe carpal tunnel syndrome with no radiculopathy and no involvement of the ulnar radial nerves.    Assessment and plan: The patient has severe carpal tunnel syndrome, most likely from injuring the nerve when she had her fracture.  Recommending exploration of median nerve at the wrist and release of the carpal tunnel.  Technical aspects procedure, usual expected recovery time, and care issues and postoperative recovery were all discussed and questions answered to her satisfaction.  The surgery is scheduled for September 29 pending satisfactory  "medical clearance.Vital signs:  Temp: 98.4  F (36.9  C) Temp src: Tympanic BP: (!) 130/92 Pulse: 100   Resp: 18 SpO2: 99 % (room air)     Height: 5' 6\" (167.6 cm) Weight: 130 lb (59 kg)  Estimated body mass index is 20.98 kg/(m^2) as calculated from the following:    Height as of this encounter: 5' 6\" (1.676 m).    Weight as of this encounter: 130 lb (59 kg).      "

## 2017-09-18 NOTE — NURSING NOTE
"Chief Complaint   Patient presents with     Musculoskeletal Problem     Left wrist emg results       Initial BP (!) 130/92 (BP Location: Right arm, Patient Position: Sitting, Cuff Size: Adult Large)  Pulse 100  Temp 98.4  F (36.9  C) (Tympanic)  Resp 18  Ht 5' 6\" (1.676 m)  Wt 130 lb (59 kg)  SpO2 99%  BMI 20.98 kg/m2 Estimated body mass index is 20.98 kg/(m^2) as calculated from the following:    Height as of this encounter: 5' 6\" (1.676 m).    Weight as of this encounter: 130 lb (59 kg).  Medication Reconciliation: unable or not appropriate to perform   Janis Moss LPN      "

## 2017-09-20 ENCOUNTER — TELEPHONE (OUTPATIENT)
Dept: ORTHOPEDICS | Facility: OTHER | Age: 62
End: 2017-09-20

## 2017-09-20 NOTE — TELEPHONE ENCOUNTER
Patient having surgery on 09/29/17 with Dr. Lovell. Her son is the only one she has to bring her and stay with her but his work isn't letting him off.  Patient wondering if we could do a letter/note stating that he needs to be with his mother.     Adonis Gray is the son he works at [employer] we can just fax it to them per patients request      Call patient when done or with any questions  [phone]

## 2017-09-21 NOTE — TELEPHONE ENCOUNTER
Scanned note for son to be with patient on surgery, in patients chart on 2017 and patient called and left message of sons .  Janis Moss LPN

## 2017-09-22 ENCOUNTER — TRANSFERRED RECORDS (OUTPATIENT)
Dept: HEALTH INFORMATION MANAGEMENT | Facility: HOSPITAL | Age: 62
End: 2017-09-22

## 2017-09-29 ENCOUNTER — SURGERY (OUTPATIENT)
Age: 62
End: 2017-09-29

## 2017-09-29 ENCOUNTER — ANESTHESIA (OUTPATIENT)
Dept: SURGERY | Facility: HOSPITAL | Age: 62
End: 2017-09-29
Payer: COMMERCIAL

## 2017-09-29 ENCOUNTER — ANESTHESIA EVENT (OUTPATIENT)
Dept: SURGERY | Facility: HOSPITAL | Age: 62
End: 2017-09-29
Payer: COMMERCIAL

## 2017-09-29 ENCOUNTER — HOSPITAL ENCOUNTER (OUTPATIENT)
Facility: HOSPITAL | Age: 62
Discharge: HOME | End: 2017-09-29
Attending: ORTHOPAEDIC SURGERY | Admitting: ORTHOPAEDIC SURGERY
Payer: COMMERCIAL

## 2017-09-29 VITALS
HEIGHT: 65 IN | RESPIRATION RATE: 20 BRPM | SYSTOLIC BLOOD PRESSURE: 139 MMHG | OXYGEN SATURATION: 100 % | TEMPERATURE: 97.1 F | BODY MASS INDEX: 22.49 KG/M2 | DIASTOLIC BLOOD PRESSURE: 76 MMHG | WEIGHT: 135 LBS

## 2017-09-29 DIAGNOSIS — Z98.890 HISTORY OF HAND SURGERY: Primary | ICD-10-CM

## 2017-09-29 PROCEDURE — 25000128 H RX IP 250 OP 636: Performed by: ORTHOPAEDIC SURGERY

## 2017-09-29 PROCEDURE — 25000125 ZZHC RX 250: Performed by: NURSE ANESTHETIST, CERTIFIED REGISTERED

## 2017-09-29 PROCEDURE — 27210995 ZZH RX 272: Performed by: NURSE ANESTHETIST, CERTIFIED REGISTERED

## 2017-09-29 PROCEDURE — 27210794 ZZH OR GENERAL SUPPLY STERILE: Performed by: ORTHOPAEDIC SURGERY

## 2017-09-29 PROCEDURE — S0020 INJECTION, BUPIVICAINE HYDRO: HCPCS | Performed by: ORTHOPAEDIC SURGERY

## 2017-09-29 PROCEDURE — 71000027 ZZH RECOVERY PHASE 2 EACH 15 MINS: Performed by: ORTHOPAEDIC SURGERY

## 2017-09-29 PROCEDURE — 01999 UNLISTED ANES PROCEDURE: CPT | Performed by: NURSE ANESTHETIST, CERTIFIED REGISTERED

## 2017-09-29 PROCEDURE — 36000052 ZZH SURGERY LEVEL 2 EA 15 ADDTL MIN: Performed by: ORTHOPAEDIC SURGERY

## 2017-09-29 PROCEDURE — 25000125 ZZHC RX 250: Performed by: ORTHOPAEDIC SURGERY

## 2017-09-29 PROCEDURE — 40000306 ZZH STATISTIC PRE PROC ASSESS II: Performed by: ORTHOPAEDIC SURGERY

## 2017-09-29 PROCEDURE — 36000050 ZZH SURGERY LEVEL 2 1ST 30 MIN: Performed by: ORTHOPAEDIC SURGERY

## 2017-09-29 PROCEDURE — 64721 CARPAL TUNNEL SURGERY: CPT | Mod: LT | Performed by: ORTHOPAEDIC SURGERY

## 2017-09-29 PROCEDURE — 37000008 ZZH ANESTHESIA TECHNICAL FEE, 1ST 30 MIN: Performed by: ORTHOPAEDIC SURGERY

## 2017-09-29 PROCEDURE — 37000009 ZZH ANESTHESIA TECHNICAL FEE, EACH ADDTL 15 MIN: Performed by: ORTHOPAEDIC SURGERY

## 2017-09-29 PROCEDURE — 25000128 H RX IP 250 OP 636: Performed by: ANESTHESIOLOGY

## 2017-09-29 PROCEDURE — 25000128 H RX IP 250 OP 636: Performed by: NURSE ANESTHETIST, CERTIFIED REGISTERED

## 2017-09-29 RX ORDER — FENTANYL CITRATE 50 UG/ML
INJECTION, SOLUTION INTRAMUSCULAR; INTRAVENOUS PRN
Status: DISCONTINUED | OUTPATIENT
Start: 2017-09-29 | End: 2017-09-29

## 2017-09-29 RX ORDER — CEFAZOLIN SODIUM 2 G/100ML
2 INJECTION, SOLUTION INTRAVENOUS
Status: COMPLETED | OUTPATIENT
Start: 2017-09-29 | End: 2017-09-29

## 2017-09-29 RX ORDER — BUPIVACAINE HYDROCHLORIDE 2.5 MG/ML
INJECTION, SOLUTION INFILTRATION; PERINEURAL PRN
Status: DISCONTINUED | OUTPATIENT
Start: 2017-09-29 | End: 2017-09-29 | Stop reason: HOSPADM

## 2017-09-29 RX ORDER — NALOXONE HYDROCHLORIDE 0.4 MG/ML
.1-.4 INJECTION, SOLUTION INTRAMUSCULAR; INTRAVENOUS; SUBCUTANEOUS
Status: DISCONTINUED | OUTPATIENT
Start: 2017-09-29 | End: 2017-09-29 | Stop reason: HOSPADM

## 2017-09-29 RX ORDER — PROPOFOL 10 MG/ML
INJECTION, EMULSION INTRAVENOUS PRN
Status: DISCONTINUED | OUTPATIENT
Start: 2017-09-29 | End: 2017-09-29

## 2017-09-29 RX ORDER — DEXAMETHASONE SODIUM PHOSPHATE 4 MG/ML
4 INJECTION, SOLUTION INTRA-ARTICULAR; INTRALESIONAL; INTRAMUSCULAR; INTRAVENOUS; SOFT TISSUE EVERY 10 MIN PRN
Status: DISCONTINUED | OUTPATIENT
Start: 2017-09-29 | End: 2017-09-29 | Stop reason: HOSPADM

## 2017-09-29 RX ORDER — HYDRALAZINE HYDROCHLORIDE 20 MG/ML
2.5-5 INJECTION INTRAMUSCULAR; INTRAVENOUS EVERY 10 MIN PRN
Status: DISCONTINUED | OUTPATIENT
Start: 2017-09-29 | End: 2017-09-29 | Stop reason: HOSPADM

## 2017-09-29 RX ORDER — HYDROCODONE BITARTRATE AND ACETAMINOPHEN 5; 325 MG/1; MG/1
1-2 TABLET ORAL
Status: DISCONTINUED | OUTPATIENT
Start: 2017-09-29 | End: 2017-09-29 | Stop reason: HOSPADM

## 2017-09-29 RX ORDER — PROMETHAZINE HYDROCHLORIDE 25 MG/ML
12.5 INJECTION, SOLUTION INTRAMUSCULAR; INTRAVENOUS
Status: DISCONTINUED | OUTPATIENT
Start: 2017-09-29 | End: 2017-09-29 | Stop reason: HOSPADM

## 2017-09-29 RX ORDER — HYDROCODONE BITARTRATE AND ACETAMINOPHEN 5; 325 MG/1; MG/1
1-2 TABLET ORAL EVERY 4 HOURS PRN
Qty: 30 TABLET | Refills: 0 | Status: SHIPPED | OUTPATIENT
Start: 2017-09-29

## 2017-09-29 RX ORDER — KETOROLAC TROMETHAMINE 30 MG/ML
30 INJECTION, SOLUTION INTRAMUSCULAR; INTRAVENOUS EVERY 6 HOURS PRN
Status: DISCONTINUED | OUTPATIENT
Start: 2017-09-29 | End: 2017-09-29 | Stop reason: HOSPADM

## 2017-09-29 RX ORDER — ONDANSETRON 4 MG/1
4 TABLET, ORALLY DISINTEGRATING ORAL
Status: DISCONTINUED | OUTPATIENT
Start: 2017-09-29 | End: 2017-09-29 | Stop reason: HOSPADM

## 2017-09-29 RX ORDER — LIDOCAINE HYDROCHLORIDE 5 MG/ML
INJECTION, SOLUTION INFILTRATION; PERINEURAL PRN
Status: DISCONTINUED | OUTPATIENT
Start: 2017-09-29 | End: 2017-09-29

## 2017-09-29 RX ORDER — KETAMINE HYDROCHLORIDE 10 MG/ML
INJECTION, SOLUTION INTRAMUSCULAR; INTRAVENOUS PRN
Status: DISCONTINUED | OUTPATIENT
Start: 2017-09-29 | End: 2017-09-29

## 2017-09-29 RX ORDER — LABETALOL HYDROCHLORIDE 5 MG/ML
10 INJECTION, SOLUTION INTRAVENOUS
Status: DISCONTINUED | OUTPATIENT
Start: 2017-09-29 | End: 2017-09-29 | Stop reason: HOSPADM

## 2017-09-29 RX ORDER — SCOLOPAMINE TRANSDERMAL SYSTEM 1 MG/1
1 PATCH, EXTENDED RELEASE TRANSDERMAL ONCE
Status: DISCONTINUED | OUTPATIENT
Start: 2017-09-29 | End: 2017-09-29 | Stop reason: HOSPADM

## 2017-09-29 RX ORDER — ALBUTEROL SULFATE 0.83 MG/ML
2.5 SOLUTION RESPIRATORY (INHALATION) EVERY 4 HOURS PRN
Status: DISCONTINUED | OUTPATIENT
Start: 2017-09-29 | End: 2017-09-29 | Stop reason: HOSPADM

## 2017-09-29 RX ORDER — ONDANSETRON 4 MG/1
4 TABLET, ORALLY DISINTEGRATING ORAL EVERY 30 MIN PRN
Status: DISCONTINUED | OUTPATIENT
Start: 2017-09-29 | End: 2017-09-29 | Stop reason: HOSPADM

## 2017-09-29 RX ORDER — SODIUM CHLORIDE, SODIUM LACTATE, POTASSIUM CHLORIDE, CALCIUM CHLORIDE 600; 310; 30; 20 MG/100ML; MG/100ML; MG/100ML; MG/100ML
INJECTION, SOLUTION INTRAVENOUS CONTINUOUS
Status: DISCONTINUED | OUTPATIENT
Start: 2017-09-29 | End: 2017-09-29 | Stop reason: HOSPADM

## 2017-09-29 RX ORDER — FENTANYL CITRATE 50 UG/ML
25-50 INJECTION, SOLUTION INTRAMUSCULAR; INTRAVENOUS
Status: DISCONTINUED | OUTPATIENT
Start: 2017-09-29 | End: 2017-09-29 | Stop reason: HOSPADM

## 2017-09-29 RX ORDER — ONDANSETRON 2 MG/ML
4 INJECTION INTRAMUSCULAR; INTRAVENOUS EVERY 30 MIN PRN
Status: DISCONTINUED | OUTPATIENT
Start: 2017-09-29 | End: 2017-09-29 | Stop reason: HOSPADM

## 2017-09-29 RX ORDER — MEPERIDINE HYDROCHLORIDE 25 MG/ML
12.5 INJECTION INTRAMUSCULAR; INTRAVENOUS; SUBCUTANEOUS
Status: DISCONTINUED | OUTPATIENT
Start: 2017-09-29 | End: 2017-09-29 | Stop reason: HOSPADM

## 2017-09-29 RX ADMIN — PROPOFOL 30 MG: 10 INJECTION, EMULSION INTRAVENOUS at 13:07

## 2017-09-29 RX ADMIN — PROPOFOL 50 MG: 10 INJECTION, EMULSION INTRAVENOUS at 13:19

## 2017-09-29 RX ADMIN — PROPOFOL 50 MG: 10 INJECTION, EMULSION INTRAVENOUS at 13:06

## 2017-09-29 RX ADMIN — PROPOFOL 50 MG: 10 INJECTION, EMULSION INTRAVENOUS at 13:30

## 2017-09-29 RX ADMIN — LIDOCAINE HYDROCHLORIDE 40 ML: 5 INJECTION, SOLUTION INFILTRATION; PERINEURAL at 13:14

## 2017-09-29 RX ADMIN — PROPOFOL 30 MG: 10 INJECTION, EMULSION INTRAVENOUS at 13:40

## 2017-09-29 RX ADMIN — KETAMINE HYDROCHLORIDE 20 MG: 10 INJECTION, SOLUTION INTRAMUSCULAR; INTRAVENOUS at 13:14

## 2017-09-29 RX ADMIN — SODIUM CHLORIDE, POTASSIUM CHLORIDE, SODIUM LACTATE AND CALCIUM CHLORIDE: 600; 310; 30; 20 INJECTION, SOLUTION INTRAVENOUS at 12:00

## 2017-09-29 RX ADMIN — KETAMINE HYDROCHLORIDE 20 MG: 10 INJECTION, SOLUTION INTRAMUSCULAR; INTRAVENOUS at 13:20

## 2017-09-29 RX ADMIN — FENTANYL CITRATE 50 MCG: 50 INJECTION, SOLUTION INTRAMUSCULAR; INTRAVENOUS at 13:07

## 2017-09-29 RX ADMIN — PROPOFOL 20 MG: 10 INJECTION, EMULSION INTRAVENOUS at 13:37

## 2017-09-29 RX ADMIN — FENTANYL CITRATE 50 MCG: 50 INJECTION, SOLUTION INTRAMUSCULAR; INTRAVENOUS at 13:13

## 2017-09-29 RX ADMIN — PROPOFOL 30 MG: 10 INJECTION, EMULSION INTRAVENOUS at 13:24

## 2017-09-29 RX ADMIN — BUPIVACAINE HYDROCHLORIDE 9 ML: 2.5 INJECTION, SOLUTION INFILTRATION; PERINEURAL at 13:51

## 2017-09-29 RX ADMIN — MIDAZOLAM HYDROCHLORIDE 2 MG: 1 INJECTION, SOLUTION INTRAMUSCULAR; INTRAVENOUS at 12:59

## 2017-09-29 RX ADMIN — PROPOFOL 30 MG: 10 INJECTION, EMULSION INTRAVENOUS at 13:13

## 2017-09-29 RX ADMIN — CEFAZOLIN SODIUM 2 G: 2 INJECTION, SOLUTION INTRAVENOUS at 13:00

## 2017-09-29 RX ADMIN — PROPOFOL 30 MG: 10 INJECTION, EMULSION INTRAVENOUS at 13:34

## 2017-09-29 RX ADMIN — PROPOFOL 30 MG: 10 INJECTION, EMULSION INTRAVENOUS at 13:22

## 2017-09-29 RX ADMIN — PROPOFOL 50 MG: 10 INJECTION, EMULSION INTRAVENOUS at 13:09

## 2017-09-29 ASSESSMENT — LIFESTYLE VARIABLES: TOBACCO_USE: 1

## 2017-09-29 NOTE — ANESTHESIA CARE TRANSFER NOTE
Patient: Millie Gamez    Procedure(s):  CARPAL TUNNEL RELEASE LEFT - Wound Class: I-Clean    Diagnosis: CARPAL TUNNEL SYNDROME OF LEFT WRIST  Diagnosis Additional Information: No value filed.    Anesthesia Type:   IV Regional Anesthesia     Note:  Airway :Nasal Cannula  Patient transferred to:Phase II  Comments: VSS.  Pt states she is comfortable.  Report to RN.      Vitals: (Last set prior to Anesthesia Care Transfer)    CRNA VITALS  9/29/2017 1322 - 9/29/2017 1352      9/29/2017             Resp Rate (observed): (!)  1    Resp Rate (set): 8                Electronically Signed By: KADY Osullivan CRNA  September 29, 2017  1:52 PM

## 2017-09-29 NOTE — OP NOTE
Preoperative diagnosis: Carpal tunnel syndrome left wrist and hand    Postoperative diagnosis: Carpal tunnel syndrome of the left wrist and hand    Procedure performed: Carpal tunnel release left wrist and hand    Anesthetic utilized: IV regional anesthesia.    Description of procedure: Patient was administered or IV regional anesthetic, once this had taken his affect the left upper extremity was prepped and draped.  The patient had had a previous wrist fracture of the distal radius and there were some minor radial deviation deformity noted at the wrist.  The volar aspect of the wrist was opened through a extensile incision and blunt sharp dissection used to expose the flexor retinaculum and transverse carpal ligament.  This was carefully opened over the underlying median nerve with a parrot tenotomy scissors.  The entire carpal ligament was opened, and the flexor retinaculum likewise opened and explored for approximately 2 cm proximal to the volar wrist crease.  This resulted in complete decompression of the median nerve at the wrist.  Tourniquet was deflated and hemostasis was assured with bipolar cautery and gentle pressure on the wound.  Once hemostasis was assured, the wound was closed with interrupted subcuticular absorbable sutures.  Steri-Strips were applied to bolster the skin repair, and a sterile dressing applied.  She was taken back to the day surgery unit with no apparent complications.

## 2017-09-29 NOTE — OR NURSING
Took cookies and juice w/o nausea.Fingers warm,wiggles all.Residual numbness and tingling.Patient and responsible adult given discharge instructions with no questions regarding instructions. Yusuf score 20 Pain level 0/10.  Discharged from unit via walking. Patient discharged to home.

## 2017-09-29 NOTE — IP AVS SNAPSHOT
MRN:[id number]                      After Visit Summary   9/29/2017    Millie Gamez    MRN: [id number]           Thank you!     Thank you for choosing [facility] for your care. Our goal is always to provide you with excellent care. Hearing back from our patients is one way we can continue to improve our services. Please take a few minutes to complete the written survey that you may receive in the mail after you visit with us. Thank you!        Patient Information     Date Of Birth          1955        About your hospital stay     You were admitted on:  September 29, 2017 You last received care in the:  [facility] Preop/Phase II    You were discharged on:  September 29, 2017       Who to Call     For medical emergencies, please call 911.  For non-urgent questions about your medical care, please call your primary care provider or clinic, [phone]  For questions related to your surgery, please call your surgery clinic        Attending Provider     Provider Robert Mcgrath DO Orthopedics       Primary Care Provider Office Phone # Fax #    Anastacio Calderon [phone]      After Care Instructions     Discharge Instructions       Review outpatient procedure discharge instructions with patient as directed by Provider            No Dressing Change       No dressing change until follow up appointment.                  Your next 10 appointments already scheduled     Oct 12, 2017  9:40 AM CDT   (Arrive by 9:25 AM)   Post Op with Robert Lovell DO    ORTHOPEDICS ([facility] )    [address] [facility] [id number] [phone]              Further instructions from your care team             POST OPERATIVE PATIENT INFORMATION  Carpal Tunnel Surgery / Median Never Decompression    DIET  No restrictions.  If you become nauseated, try fluids such as tea, lemon-lime pop, or soup.  CARE OF OPERATIVE SITE  Keep the bandage over your incision dry and  in place until you see your doctor.  To minimize swelling during the 24-48 hours after surgery, keep the hand elevated in a sling when you are up and around or on a pillow when you are in bed or sitting in a chair.  Check your fingers for circulation, sensation and motion every hour the day of surgery and every 4 hours the second day after surgery.  Fingers should be pink, warm, and have  feeling .  HAND EXERCISES  To prevent stiffness, make a fist and release is slowly several times every hour for the first 48 hours after surgery.  Touch your little finger to your thumb.  Do NOT stretch the palm of your hand.  DISCOMFORT  Pain and tingling may disappear almost immediately after surgery or diminish gradually for several weeks or months.  Your doctor may give you a prescription for pain or you may use aspirin, Tylenol, or the pain medication you took before surgery.  Normal sensation returns slowly - often taking up to 1 year.  In some people, the middle finger remains numb even longer.  ACTIVITY  Rest and relax for at least 12 hours after surgery.  Avoid lifting heavy objects for at least 3 weeks.  Your doctor will give you specific instructions and time frame.  Wear a sling when you work or play.  Carry small objects in a shoulder bag instead of in your hand.  CONTACT YOUR DOCTOR  Contact your doctor if any of the following conditions occur:    Your fingers become cold, blue, or swollen.    Your pain pills do not relieve the pain.    You develop a fever of 100 degrees or higher. (A low grade fever below 100 degrees may occur as a normal body response to the surgical procedure.)    If you are uncertain about any of the above items or have any questions, please contact the [facility] at [phone].  After hours, please contact the Emergency Room at [phone].     Post-Anesthesia Patient Instructions    IMMEDIATELY FOLLOWING SURGERY:  Do not drive or operate machinery for the first twenty four  "hours after surgery.  Do not make any important decisions for twenty four hours after surgery or while taking narcotic pain medications or sedatives.  If you develop intractable nausea and vomiting or a severe headache please notify your doctor immediately.    FOLLOW-UP:  Please make an appointment with your surgeon as instructed. You do not need to follow up with anesthesia unless specifically instructed to do so.    WOUND CARE INSTRUCTIONS (if applicable):  Keep a dry clean dressing on the anesthesia/puncture wound site if there is drainage.  Once the wound has quit draining you may leave it open to air.  Generally you should leave the bandage intact for twenty four hours unless there is drainage.  If the epidural site drains for more than 36-48 hours please call the anesthesia department.    QUESTIONS?:  Please feel free to call your physician or the hospital  if you have any questions, and they will be happy to assist you.       Pending Results     No orders found from 9/27/2017 to 9/30/2017.            Admission Information     Date & Time Provider Department Dept. Phone    9/29/2017 Robert Lovell DO [facility] Preop/Phase [phone]      Your Vitals Were     Blood Pressure Temperature Respirations Height Weight Pulse Oximetry    129/73 97.4  F (36.3  C) (Oral) 18 1.651 m (5' 5\") 61.2 kg (135 lb) 99%    BMI (Body Mass Index)                   22.47 kg/m2           [employer] Information     [employer] lets you send messages to your doctor, view your test results, renew your prescriptions, schedule appointments and more. To sign up, go to [email or web address] . Click on \"Log in\" on the left side of the screen, which will take you to the Welcome page. Then click on \"Sign up Now\" on the right side of the page.     You will be asked to enter the access code listed below, as well as some personal information. Please follow the directions to create your username and password.     Your access code is: " [email or web address]  Expires: 2017 10:59 AM     Your access code will  in 90 days. If you need help or a new code, please call your [facility] clinic or [phone].        Care EveryWhere ID     This is your Care EveryWhere ID. This could be used by other organizations to access your [facility] medical records  [id number]        Equal Access to Services     [facility] CenterFRANCISCA : Hadii aad ku hadasho Soomaali, waaxda luqadaha, qaybta kaalmada adeegyada, waxay idiin hayaan adebernarda goodman laNasreenpj . So [facility] [phone].    ATENCIÓN: Si addisonla cindy, tiene a hamilton disposición servicios gratuitos de asistencia lingüística. Llame al [phone].    We comply with applicable federal civil rights laws and [place] laws. We do not discriminate on the basis of race, color, national origin, age, disability, sex, sexual orientation, or gender identity.               Review of your medicines      START taking        Dose / Directions    HYDROcodone-acetaminophen 5-325 MG per tablet   Commonly known as:  NORCO   Used for:  History of hand surgery        Dose:  1-2 tablet   Take 1-2 tablets by mouth every 4 hours as needed for other (Moderate to Severe Pain)   Quantity:  30 tablet   Refills:  0         CONTINUE these medicines which have NOT CHANGED        Dose / Directions    ciclopirox 8 % Soln        Apply topically at bedtime.  Apply evenly over nail & surrounding skin at bedtime (or allow 8 hours before washing).   Refills:  0       MULTIvitamin  S Caps        Refills:  0       nitroFURantoin 50 MG capsule   Commonly known as:  MACRODANTIN        Dose:  50 mg   Take 50 mg by mouth At Bedtime   Refills:  0       rizatriptan 10 MG ODT tab   Commonly known as:  MAXALT-MLT   Indication:  Migraine Headache        Dose:  10 mg   Take 10 mg by mouth   Refills:  0       vitamin D3 2000 UNITS Caps        Dose:  2000 Units   Take 2,000 Units by mouth 2 times daily   Refills:  0            Where to get your medicines      Some of these will  need a paper prescription and others can be bought over the counter. Ask your nurse if you have questions.     Bring a paper prescription for each of these medications     HYDROcodone-acetaminophen 5-325 MG per tablet                Protect others around you: Learn how to safely use, store and throw away your medicines at www.disposemymeds.org.             Medication List: This is a list of all your medications and when to take them. Check marks below indicate your daily home schedule. Keep this list as a reference.      Medications           Morning Afternoon Evening Bedtime As Needed    ciclopirox 8 % Soln   Apply topically at bedtime.  Apply evenly over nail & surrounding skin at bedtime (or allow 8 hours before washing).                                HYDROcodone-acetaminophen 5-325 MG per tablet   Commonly known as:  NORCO   Take 1-2 tablets by mouth every 4 hours as needed for other (Moderate to Severe Pain)                                MULTIvitamin  S Caps                                nitroFURantoin 50 MG capsule   Commonly known as:  MACRODANTIN   Take 50 mg by mouth At Bedtime                                rizatriptan 10 MG ODT tab   Commonly known as:  MAXALT-MLT   Take 10 mg by mouth                                vitamin D3 2000 UNITS Caps   Take 2,000 Units by mouth 2 times daily

## 2017-09-29 NOTE — ANESTHESIA POSTPROCEDURE EVALUATION
Patient: Millie Gamez    Procedure(s):  CARPAL TUNNEL RELEASE LEFT - Wound Class: I-Clean    Diagnosis:CARPAL TUNNEL SYNDROME OF LEFT WRIST  Diagnosis Additional Information: No value filed.    Anesthesia Type:  IV Regional Anesthesia    Note:  Anesthesia Post Evaluation    Patient location during evaluation: Phase 2  Patient participation: Able to fully participate in evaluation  Level of consciousness: awake and alert  Pain management: adequate  Airway patency: patent  Cardiovascular status: acceptable and hemodynamically stable  Respiratory status: acceptable  Hydration status: stable  PONV: none     Anesthetic complications: None    Comments: Pt comfortable.  VSS        Last vitals:  Vitals:    09/29/17 1415 09/29/17 1420 09/29/17 1430   BP: 135/74 172/81 139/76   Resp: 18 18 20   Temp:   97.1  F (36.2  C)   SpO2: 99% 100% 100%         Electronically Signed By: KADY Osullivan CRNA  September 29, 2017  4:26 PM

## 2017-09-29 NOTE — DISCHARGE INSTRUCTIONS
POST OPERATIVE PATIENT INFORMATION  Carpal Tunnel Surgery / Median Never Decompression    DIET  No restrictions.  If you become nauseated, try fluids such as tea, lemon-lime pop, or soup.  CARE OF OPERATIVE SITE  Keep the bandage over your incision dry and in place until you see your doctor.  To minimize swelling during the 24-48 hours after surgery, keep the hand elevated in a sling when you are up and around or on a pillow when you are in bed or sitting in a chair.  Check your fingers for circulation, sensation and motion every hour the day of surgery and every 4 hours the second day after surgery.  Fingers should be pink, warm, and have  feeling .  HAND EXERCISES  To prevent stiffness, make a fist and release is slowly several times every hour for the first 48 hours after surgery.  Touch your little finger to your thumb.  Do NOT stretch the palm of your hand.  DISCOMFORT  Pain and tingling may disappear almost immediately after surgery or diminish gradually for several weeks or months.  Your doctor may give you a prescription for pain or you may use aspirin, Tylenol, or the pain medication you took before surgery.  Normal sensation returns slowly - often taking up to 1 year.  In some people, the middle finger remains numb even longer.  ACTIVITY  Rest and relax for at least 12 hours after surgery.  Avoid lifting heavy objects for at least 3 weeks.  Your doctor will give you specific instructions and time frame.  Wear a sling when you work or play.  Carry small objects in a shoulder bag instead of in your hand.  CONTACT YOUR DOCTOR  Contact your doctor if any of the following conditions occur:    Your fingers become cold, blue, or swollen.    Your pain pills do not relieve the pain.    You develop a fever of 100 degrees or higher. (A low grade fever below 100 degrees may occur as a normal body response to the surgical procedure.)    If you are uncertain about any of the above items or have any questions,  please contact the [facility] at [phone].  After hours, please contact the Emergency Room at [phone].     Post-Anesthesia Patient Instructions    IMMEDIATELY FOLLOWING SURGERY:  Do not drive or operate machinery for the first twenty four hours after surgery.  Do not make any important decisions for twenty four hours after surgery or while taking narcotic pain medications or sedatives.  If you develop intractable nausea and vomiting or a severe headache please notify your doctor immediately.    FOLLOW-UP:  Please make an appointment with your surgeon as instructed. You do not need to follow up with anesthesia unless specifically instructed to do so.    WOUND CARE INSTRUCTIONS (if applicable):  Keep a dry clean dressing on the anesthesia/puncture wound site if there is drainage.  Once the wound has quit draining you may leave it open to air.  Generally you should leave the bandage intact for twenty four hours unless there is drainage.  If the epidural site drains for more than 36-48 hours please call the anesthesia department.    QUESTIONS?:  Please feel free to call your physician or the hospital  if you have any questions, and they will be happy to assist you.

## 2017-09-29 NOTE — ANESTHESIA PREPROCEDURE EVALUATION
Anesthesia Evaluation     . Pt has had prior anesthetic.     No history of anesthetic complications          ROS/MED HX    ENT/Pulmonary:     (+)tobacco use, Past use quit 1997 packs/day  , . .    Neurologic:     (+)migraines,     Cardiovascular:  - neg cardiovascular ROS       METS/Exercise Tolerance:  >4 METS   Hematologic:     (+) Other Hematologic Disorder-thrombocytopenia      Musculoskeletal:   (+) , , other musculoskeletal- left CARPAL TUNNEL SYNDROME      GI/Hepatic:  - neg GI/hepatic ROS       Renal/Genitourinary:  - ROS Renal section negative       Endo:  - neg endo ROS       Psychiatric:  - neg psychiatric ROS       Infectious Disease:  - neg infectious disease ROS       Malignancy:   (+) Malignancy History of Breast  Breast CA Remission status post Surgery, Chemo and Radiation. 2003        Other:    - neg other ROS                 Physical Exam  Normal systems: cardiovascular, pulmonary and dental    Airway   Mallampati: III  TM distance: >3 FB  Neck ROM: full    Dental     Cardiovascular   Rhythm and rate: regular and normal      Pulmonary    breath sounds clear to auscultation                    Anesthesia Plan      History & Physical Review  History and physical reviewed and following examination; no interval change.    ASA Status:  2 .    NPO Status:  > 8 hours    Plan for IV Regional Anesthesia with Intravenous and Propofol induction. Maintenance will be TIVA.    PONV prophylaxis:  Ondansetron (or other 5HT-3), Scopolamine patch and Dexamethasone or Solumedrol  Patient requests additional sedation      Postoperative Care  Postoperative pain management:  IV analgesics and Oral pain medications.      Consents  Anesthetic plan, risks, benefits and alternatives discussed with:  Patient..                          .

## 2017-09-29 NOTE — IP AVS SNAPSHOT
HI Preop/Phase II    [address] [address]    Phone:  [phone]                                       After Visit Summary   9/29/2017    Millie Gamez    MRN: [id number]           After Visit Summary Signature Page     I have received my discharge instructions, and my questions have been answered. I have discussed any challenges I see with this plan with the nurse or doctor.    ..........................................................................................................................................  Patient/Patient Representative Signature      ..........................................................................................................................................  Patient Representative Print Name and Relationship to Patient    ..................................................               ................................................  Date                                            Time    ..........................................................................................................................................  Reviewed by Signature/Title    ...................................................              ..............................................  Date                                                            Time

## 2017-09-29 NOTE — ANESTHESIA PROCEDURE NOTES
Peripheral nerve/Neuraxial procedure note : Other  Pre-Procedure  Performed by   Referred by DR. PARISI  Location: OR    Procedure Times:9/29/2017 1:08 PM and 9/29/2017 1:14 PM  Pre-Anesthestic Checklist: patient identified, IV checked, site marked, risks and benefits discussed, informed consent, monitors and equipment checked, pre-op evaluation, at physician/surgeon's request and post-op pain management    Timeout  Correct Patient: Yes   Correct Procedure: Yes   Correct Site: Yes   Correct Laterality: Yes   Correct Position: Yes   Site Marked: Yes   .   Procedure Documentation    Diagnosis:IV REGIONAL LEFT ARM.    Procedure:    Other.     .  .  Needle: Insertion Method: Single Shot.       Assessment/Narrative  Paresthesias: No.  .  The placement was negative for: blood aspirated, painful injection and site bleeding.  Bolus given via catheter. No blood aspirated via catheter.   Secured via.   Complications: none. Comments:  IV regional in 22g left hand after TQ up left arm 250 mmHg.

## 2017-10-06 ENCOUNTER — HOSPITAL ENCOUNTER (OUTPATIENT)
Dept: BONE DENSITY | Facility: HOSPITAL | Age: 62
Discharge: HOME | End: 2017-10-06
Attending: FAMILY MEDICINE | Admitting: FAMILY MEDICINE
Payer: COMMERCIAL

## 2017-10-06 DIAGNOSIS — M81.0 AGE RELATED OSTEOPOROSIS: ICD-10-CM

## 2017-10-06 DIAGNOSIS — Z78.0 POSTMENOPAUSAL: ICD-10-CM

## 2017-10-06 PROCEDURE — 77080 DXA BONE DENSITY AXIAL: CPT | Mod: TC

## 2017-10-12 ENCOUNTER — OFFICE VISIT (OUTPATIENT)
Dept: ORTHOPEDICS | Facility: OTHER | Age: 62
End: 2017-10-12
Attending: ORTHOPAEDIC SURGERY
Payer: COMMERCIAL

## 2017-10-12 VITALS
DIASTOLIC BLOOD PRESSURE: 74 MMHG | OXYGEN SATURATION: 98 % | WEIGHT: 135 LBS | TEMPERATURE: 97.8 F | BODY MASS INDEX: 22.49 KG/M2 | SYSTOLIC BLOOD PRESSURE: 130 MMHG | HEIGHT: 65 IN | HEART RATE: 96 BPM

## 2017-10-12 DIAGNOSIS — G56.02 CARPAL TUNNEL SYNDROME OF LEFT WRIST: Primary | ICD-10-CM

## 2017-10-12 PROCEDURE — 99212 OFFICE O/P EST SF 10 MIN: CPT | Performed by: COUNSELOR

## 2017-10-12 PROCEDURE — 99024 POSTOP FOLLOW-UP VISIT: CPT | Performed by: ORTHOPAEDIC SURGERY

## 2017-10-12 ASSESSMENT — PAIN SCALES - GENERAL: PAINLEVEL: MILD PAIN (3)

## 2017-10-12 NOTE — PROGRESS NOTES
"Patient presents today 2 weeks after her left carpal tunnel release, her wounds are clean and dry, she has had no symptoms of paresthesias, burning pain or night pain since the procedure.  She still has some numbness in the fingertips, this is often the last thing that completely resolves following the release.  She was cautioned and how to regain use of the hand as well as activities to avoid, and will follow up if she has any difficulty regaining complete use of her hand over the next 2-3 weeks./74  Pulse 96  Temp 97.8  F (36.6  C) (Tympanic)  Ht 5' 5\" (1.651 m)  Wt 135 lb (61.2 kg)  SpO2 98%  BMI 22.47 kg/m2  "

## 2017-10-12 NOTE — MR AVS SNAPSHOT
"              After Visit Summary   10/12/2017    Millie Gamez    MRN: [id number]           Patient Information     Date Of Birth          1955        Visit Information        Provider Department      10/12/2017 9:40 AM Robert Lovell, DO  OR[facility]ED[facility]         Follow-ups after your visit        Who to contact     If you have questions or need follow up information about today's clinic visit or your schedule please contact  ORTHOPEDICS directly at [phone].  Normal or non-critical lab and imaging results will be communicated to you by [employer], letter or phone within 4 business days after the clinic has received the results. If you do not hear from us within 7 days, please contact the clinic through [employer] or phone. If you have a critical or abnormal lab result, we will notify you by phone as soon as possible.  Submit refill requests through [employer] or call your pharmacy and they will forward the refill request to us. Please allow 3 business days for your refill to be completed.          Additional Information About Your Visit        [employer] Information     [employer] lets you send messages to your doctor, view your test results, renew your prescriptions, schedule appointments and more. To sign up, go to [email or web address] . Click on \"Log in\" on the left side of the screen, which will take you to the Welcome page. Then click on \"Sign up Now\" on the right side of the page.     You will be asked to enter the access code listed below, as well as some personal information. Please follow the directions to create your username and password.     Your access code is: [email or web address]  Expires: 2017 10:59 AM     Your access code will  in 90 days. If you need help or a new code, please call your [facility] clinic or [phone].        Care EveryWhere ID     This is your Care EveryWhere ID. This could be used by other organizations to access your [facility] medical records  [id number]        Your " "Vitals Were     Pulse Temperature Height Pulse Oximetry BMI (Body Mass Index)       96 97.8  F (36.6  C) (Tympanic) 5' 5\" (1.651 m) 98% 22.47 kg/m2        Blood Pressure from Last 3 Encounters:   10/12/17 130/74   09/29/17 139/76   09/18/17 (!) 130/92    Weight from Last 3 Encounters:   10/12/17 135 lb (61.2 kg)   09/29/17 135 lb (61.2 kg)   09/18/17 130 lb (59 kg)              Today, you had the following     No orders found for display       Primary Care Provider Office Phone # Fax #    Anastacio Calderon [phone]       [facility] [address] [facility] [address]        Equal Access to Services     [facility] CenterFRANCISCA : Bairon bey Soyumiko, waaxda luqadaha, qaybta kaalmada adebernardayamilton, giorgio monten[place]ro . So [facility] [phone].    ATENCIÓN: Si habla español, tiene a hamilton disposición servicios gratuitos de asistencia lingüística. Guillaume al [phone].    We comply with applicable federal civil rights laws and [place] laws. We do not discriminate on the basis of race, color, national origin, age, disability, sex, sexual orientation, or gender identity.            Thank you!     Thank you for choosing  ORTHOPEDICS  for your care. Our goal is always to provide you with excellent care. Hearing back from our patients is one way we can continue to improve our services. Please take a few minutes to complete the written survey that you may receive in the mail after your visit with us. Thank you!             Your Updated Medication List - Protect others around you: Learn how to safely use, store and throw away your medicines at www.disposemymeds.org.          This list is accurate as of: 10/12/17  9:44 AM.  Always use your most recent med list.                   Brand Name Dispense Instructions for use Diagnosis    ciclopirox 8 % Soln      Apply topically at bedtime.  Apply evenly over nail & surrounding skin at bedtime (or allow 8 hours before washing).        " HYDROcodone-acetaminophen 5-325 MG per tablet    NORCO    30 tablet    Take 1-2 tablets by mouth every 4 hours as needed for other (Moderate to Severe Pain)    History of hand surgery       MULTIvitamin  S Caps           nitroFURantoin 50 MG capsule    MACRODANTIN     Take 50 mg by mouth At Bedtime        rizatriptan 10 MG ODT tab    MAXALT-MLT     Take 10 mg by mouth        vitamin D3 2000 UNITS Caps      Take 2,000 Units by mouth 2 times daily

## 2017-10-12 NOTE — NURSING NOTE
"Chief Complaint   Patient presents with     Surgical Followup     Left Carpal Tunnel       Initial /74  Pulse 96  Temp 97.8  F (36.6  C) (Tympanic)  Ht 5' 5\" (1.651 m)  Wt 135 lb (61.2 kg)  SpO2 98%  BMI 22.47 kg/m2 Estimated body mass index is 22.47 kg/(m^2) as calculated from the following:    Height as of this encounter: 5' 5\" (1.651 m).    Weight as of this encounter: 135 lb (61.2 kg).  Medication Reconciliation: complete     Ban Price      "

## 2019-06-14 ENCOUNTER — HOSPITAL ENCOUNTER (OUTPATIENT)
Dept: CT IMAGING | Facility: HOSPITAL | Age: 64
Discharge: HOME | End: 2019-06-14
Attending: FAMILY MEDICINE | Admitting: FAMILY MEDICINE
Payer: COMMERCIAL

## 2019-06-14 DIAGNOSIS — R31.9 HEMATURIA: ICD-10-CM

## 2019-06-14 PROCEDURE — 74176 CT ABD & PELVIS W/O CONTRAST: CPT | Mod: TC

## 2019-07-02 ENCOUNTER — TELEPHONE (OUTPATIENT)
Dept: NUCLEAR MEDICINE | Facility: HOSPITAL | Age: 64
End: 2019-07-02

## 2019-07-02 NOTE — TELEPHONE ENCOUNTER
Appointment Reminder Call    -Exam prep  -When and where to register  -Appointment length with waiting time- suggested a book    -No questions.

## 2019-07-03 ENCOUNTER — HOSPITAL ENCOUNTER (OUTPATIENT)
Dept: CARDIOLOGY | Facility: HOSPITAL | Age: 64
Setting detail: NUCLEAR MEDICINE
End: 2019-07-03
Attending: FAMILY MEDICINE
Payer: COMMERCIAL

## 2019-07-03 ENCOUNTER — HOSPITAL ENCOUNTER (OUTPATIENT)
Dept: NUCLEAR MEDICINE | Facility: HOSPITAL | Age: 64
Setting detail: NUCLEAR MEDICINE
End: 2019-07-03
Attending: FAMILY MEDICINE
Payer: COMMERCIAL

## 2019-07-03 ENCOUNTER — HOSPITAL ENCOUNTER (OUTPATIENT)
Dept: NUCLEAR MEDICINE | Facility: HOSPITAL | Age: 64
Setting detail: NUCLEAR MEDICINE
Discharge: HOME | End: 2019-07-03
Attending: FAMILY MEDICINE | Admitting: FAMILY MEDICINE
Payer: COMMERCIAL

## 2019-07-03 DIAGNOSIS — Z01.818 PREOP GENERAL PHYSICAL EXAM: ICD-10-CM

## 2019-07-03 PROCEDURE — 78452 HT MUSCLE IMAGE SPECT MULT: CPT | Mod: TC

## 2019-07-03 PROCEDURE — 93017 CV STRESS TEST TRACING ONLY: CPT | Performed by: INTERNAL MEDICINE

## 2019-07-03 PROCEDURE — 25800030 ZZH RX IP 258 OP 636: Performed by: INTERNAL MEDICINE

## 2019-07-03 PROCEDURE — 93016 CV STRESS TEST SUPVJ ONLY: CPT | Performed by: INTERNAL MEDICINE

## 2019-07-03 PROCEDURE — 93018 CV STRESS TEST I&R ONLY: CPT | Performed by: INTERNAL MEDICINE

## 2019-07-03 PROCEDURE — A9500 TC99M SESTAMIBI: HCPCS | Performed by: RADIOLOGY

## 2019-07-03 PROCEDURE — 34300033 ZZH RX 343: Performed by: RADIOLOGY

## 2019-07-03 RX ORDER — SODIUM CHLORIDE 9 MG/ML
INJECTION, SOLUTION INTRAVENOUS ONCE
Status: COMPLETED | OUTPATIENT
Start: 2019-07-03 | End: 2019-07-03

## 2019-07-03 RX ADMIN — Medication 30 MILLICURIE: at 08:19

## 2019-07-03 RX ADMIN — SODIUM CHLORIDE: 9 INJECTION, SOLUTION INTRAVENOUS at 08:01

## 2019-07-03 RX ADMIN — Medication 10 MILLICURIE: at 06:28

## 2019-10-16 NOTE — PROGRESS NOTES
Left Wrist XR report routed to PCP, Dr. FLORI Calderon at .  Impression - INTRA-ARTICULAR DISTAL LEFT RADIUS FRACTURE AND PROBABLE TINY AVULSION FRACTURE OF THE ULNAR STYLOID.pt was seen in [facility] on 5/27.  Advised to follow up with Primary Care provider if symptoms increase prior to your Orthopedic evaluation.  Orthopedic Adult Referral placed by DEISY Vásquez.
No

## 2019-10-25 ENCOUNTER — HOSPITAL ENCOUNTER (OUTPATIENT)
Dept: BONE DENSITY | Facility: HOSPITAL | Age: 64
Discharge: HOME | End: 2019-10-25
Attending: FAMILY MEDICINE | Admitting: FAMILY MEDICINE
Payer: COMMERCIAL

## 2019-10-25 DIAGNOSIS — M81.8 OTHER OSTEOPOROSIS WITHOUT CURRENT PATHOLOGICAL FRACTURE: ICD-10-CM

## 2019-10-25 PROCEDURE — 77080 DXA BONE DENSITY AXIAL: CPT | Mod: TC

## 2020-07-09 ENCOUNTER — HOSPITAL ENCOUNTER (EMERGENCY)
Facility: HOSPITAL | Age: 65
Discharge: HOME | End: 2020-07-09
Attending: NURSE PRACTITIONER | Admitting: NURSE PRACTITIONER
Payer: MEDICARE

## 2020-07-09 VITALS
RESPIRATION RATE: 16 BRPM | SYSTOLIC BLOOD PRESSURE: 143 MMHG | DIASTOLIC BLOOD PRESSURE: 79 MMHG | TEMPERATURE: 98.1 F | HEART RATE: 103 BPM | OXYGEN SATURATION: 100 %

## 2020-07-09 DIAGNOSIS — N39.0 URINARY TRACT INFECTION: ICD-10-CM

## 2020-07-09 LAB
ALBUMIN UR-MCNC: 10 MG/DL
APPEARANCE UR: ABNORMAL
BACTERIA #/AREA URNS HPF: ABNORMAL /HPF
BILIRUB UR QL STRIP: NEGATIVE
COLOR UR AUTO: YELLOW
GLUCOSE UR STRIP-MCNC: NEGATIVE MG/DL
HGB UR QL STRIP: ABNORMAL
KETONES UR STRIP-MCNC: NEGATIVE MG/DL
LEUKOCYTE ESTERASE UR QL STRIP: ABNORMAL
MUCOUS THREADS #/AREA URNS LPF: PRESENT /LPF
NITRATE UR QL: POSITIVE
PH UR STRIP: 5.5 PH (ref 4.7–8)
RBC #/AREA URNS AUTO: 6 /HPF (ref 0–2)
SOURCE: ABNORMAL
SP GR UR STRIP: 1.02 (ref 1–1.03)
SQUAMOUS #/AREA URNS AUTO: 2 /HPF (ref 0–1)
UROBILINOGEN UR STRIP-MCNC: NORMAL MG/DL (ref 0–2)
WBC #/AREA URNS AUTO: >182 /HPF (ref 0–5)
WBC CLUMPS #/AREA URNS HPF: PRESENT /HPF

## 2020-07-09 PROCEDURE — 81001 URINALYSIS AUTO W/SCOPE: CPT | Performed by: NURSE PRACTITIONER

## 2020-07-09 PROCEDURE — 87088 URINE BACTERIA CULTURE: CPT | Performed by: NURSE PRACTITIONER

## 2020-07-09 PROCEDURE — 87086 URINE CULTURE/COLONY COUNT: CPT | Performed by: NURSE PRACTITIONER

## 2020-07-09 PROCEDURE — G0463 HOSPITAL OUTPT CLINIC VISIT: HCPCS

## 2020-07-09 PROCEDURE — 87186 SC STD MICRODIL/AGAR DIL: CPT | Performed by: NURSE PRACTITIONER

## 2020-07-09 PROCEDURE — 99203 OFFICE O/P NEW LOW 30 MIN: CPT | Mod: Z6 | Performed by: NURSE PRACTITIONER

## 2020-07-09 RX ORDER — SULFAMETHOXAZOLE/TRIMETHOPRIM 800-160 MG
1 TABLET ORAL 2 TIMES DAILY
Qty: 14 TABLET | Refills: 0 | Status: SHIPPED | OUTPATIENT
Start: 2020-07-09 | End: 2020-07-16

## 2020-07-09 ASSESSMENT — ENCOUNTER SYMPTOMS
CONSTITUTIONAL NEGATIVE: 1
RESPIRATORY NEGATIVE: 1
CARDIOVASCULAR NEGATIVE: 1

## 2020-07-09 NOTE — DISCHARGE INSTRUCTIONS
Complete antibiotic as directed. Stop your macrobid during this duration, but start again once you are done with Bactrim for 7 days    Continue to push water intake.    Seek medical care with any worsening symptoms or if no start of improvement over the next 1-2 days.

## 2020-07-09 NOTE — ED PROVIDER NOTES
History     Chief Complaint   Patient presents with     Rule out Urinary Tract Infection     self caths- notes felt like has had uti for weeks, but worse this last week.     HPI    Millie Gamez is a 65 year old female who is here with concerns regarding a UTI. Noted history of neurogenic bladder and self-caths. Takes Macrobid 50 mg once daily for prophylaxis.   Symptoms include:  Length of symptoms: intermittent x 2-3 weeks.Symtpoms worse in the last few days include mid lower back pain.  She then wonders if it is her mattress. Denies chronic issues with back    There is no nausea, vomiting, fever, chills, sweats, hematuria.  Last UTI she thinks was a year ago.  Patient rerports adequate fluid intake.  Home treatment: increased water and cranberry juice    Allergies:  No Known Allergies    Problem List:    There are no active problems to display for this patient.       Past Medical History:    History reviewed. No pertinent past medical history.    Past Surgical History:    Past Surgical History:   Procedure Laterality Date     BREAST SURGERY       COLONOSCOPY N/A 10/15/2015    Procedure: COLONOSCOPY;  Surgeon: Murray Calderon MD;  Location: [facility]     GYN SURGERY       ORTHOPEDIC SURGERY       RELEASE CARPAL TUNNEL Left 2017    Procedure: RELEASE CARPAL TUNNEL;  CARPAL TUNNEL RELEASE LEFT;  Surgeon: Robert Lovell DO;  Location: [facility] OR       Family History:    No family history on file.    Social History:  Marital Status:   [5]  Social History     Tobacco Use     Smoking status: Former Smoker     Types: Cigarettes     Last attempt to quit: 1997     Years since quittin.5     Smokeless tobacco: Never Used   Substance Use Topics     Alcohol use: Yes     Comment: occation     Drug use: No        Medications:    sulfamethoxazole-trimethoprim (BACTRIM DS) 800-160 MG tablet  Cholecalciferol (VITAMIN D3) 2000 UNITS CAPS  ciclopirox 8 % SOLN  HYDROcodone-acetaminophen (NORCO) 5-325 MG per  tablet  Multiple Vitamin (MULTIVITAMIN  S) CAPS  rizatriptan (MAXALT-MLT) 10 MG ODT tab          Review of Systems   Constitutional: Negative.    Respiratory: Negative.    Cardiovascular: Negative.    Genitourinary:        Neurogenic bladder. Self-caths, no change in amount of urine. Chronic foul smelling urine. More cloudy. Denies hematuria or vaginal symptoms. Denies abdominal pain/pelvic pain.    Musculoskeletal:        Mid lower back pain x 2-3 days, wakes up with it at night time.    Skin: Negative.        Physical Exam   BP: 143/79  Pulse: 103  Temp: 98.1  F (36.7  C)  Resp: 16  SpO2: 100 %      Physical Exam  Vitals signs and nursing note reviewed.   Constitutional:       General: She is not in acute distress.     Appearance: Normal appearance.   HENT:      Mouth/Throat:      Mouth: Mucous membranes are moist.      Pharynx: Oropharynx is clear.   Neck:      Musculoskeletal: Normal range of motion and neck supple.   Cardiovascular:      Rate and Rhythm: Normal rate and regular rhythm.   Pulmonary:      Effort: Pulmonary effort is normal.      Breath sounds: Normal breath sounds.   Abdominal:      General: Abdomen is flat. Bowel sounds are normal. There is no distension.      Palpations: Abdomen is soft. There is no mass.      Tenderness: There is no abdominal tenderness. There is no right CVA tenderness, left CVA tenderness, guarding or rebound.   Musculoskeletal:      Comments: I am unable to reproduce any lower back pain with palpation.  She does have full range of motion of the back.   Lymphadenopathy:      Cervical: No cervical adenopathy.   Skin:     General: Skin is warm.   Neurological:      Mental Status: She is alert.   Psychiatric:         Mood and Affect: Mood normal.         Behavior: Behavior normal.         ED Course        Procedures      Results for orders placed or performed during the hospital encounter of 07/09/20 (from the past 24 hour(s))   UA with Microscopic reflex to Culture     Specimen: Catheterized Urine   Result Value Ref Range    Color Urine Yellow     Appearance Urine Cloudy     Glucose Urine Negative NEG^Negative mg/dL    Bilirubin Urine Negative NEG^Negative    Ketones Urine Negative NEG^Negative mg/dL    Specific Gravity Urine 1.018 1.003 - 1.035    Blood Urine Small (A) NEG^Negative    pH Urine 5.5 4.7 - 8.0 pH    Protein Albumin Urine 10 (A) NEG^Negative mg/dL    Urobilinogen mg/dL Normal 0.0 - 2.0 mg/dL    Nitrite Urine Positive (A) NEG^Negative    Leukocyte Esterase Urine Large (A) NEG^Negative    Source Catheterized Urine     WBC Urine >182 (H) 0 - 5 /HPF    RBC Urine 6 (H) 0 - 2 /HPF    WBC Clumps Present (A) NEG^Negative /HPF    Bacteria Urine Moderate (A) NEG^Negative /HPF    Squamous Epithelial /HPF Urine 2 (H) 0 - 1 /HPF    Mucous Urine Present (A) NEG^Negative /LPF       Medications - No data to display    Assessments & Plan (with Medical Decision Making)   Assessment:  The patient is a 65-year-old female with a history of neurogenic bladder who self caths herself, here with complaints of intermittent cloudy urine and mid lower back pain for last to 3 weeks.  Unable to determine if she has any urinary frequency, urgency, dysuria given the history of the neurogenic bladder.  Denies any hematuria, fever, chills, sweats, nausea, vomiting.  She states she is very well hydrated and has been working on drinking more water given the symptoms.  Mid lower back pain seems more consistent with musculoskeletal etiology more so than flank pain.  Her urinalysis is positive for a UTI.  She is on chronic prophylactic Macrobid 50 mg once daily at nighttime.    Plan: We will treat with Bactrim twice a day for 7 days.  We will have her stop her Macrobid during the duration of this treatment, then started again once the Bactrim is done.  Continue to increase fluids and monitor symptoms.    Instructed to follow up with PCP in 5-7 days, and to seek medical care sooner with any new or  worsening symptoms.    Patient agrees with plan of care and verbalizes understanding.       I have reviewed the nursing notes.  I have reviewed the findings, diagnosis, plan and need for follow up with the patient.    New Prescriptions    SULFAMETHOXAZOLE-TRIMETHOPRIM (BACTRIM DS) 800-160 MG TABLET    Take 1 tablet by mouth 2 times daily for 7 days       Final diagnoses:   Urinary tract infection       7/9/2020   [facility]     Laney Baez, KADY CNP  07/09/20 1121

## 2020-07-09 NOTE — ED TRIAGE NOTES
"Pt is here with c/o \"aching and stinky and cloudy urine\"  Ongoing for a few weeks  Pt notes she self cath  No otc medications  "

## 2020-07-09 NOTE — ED AVS SNAPSHOT
[facility]  [address] [address]  Phone:  [phone]                                    Millie Gamez   MRN: [id number]    Department:  [facility]   Date of Visit:  7/9/2020           After Visit Summary Signature Page    I have received my discharge instructions, and my questions have been answered. I have discussed any challenges I see with this plan with the nurse or doctor.    ..........................................................................................................................................  Patient/Patient Representative Signature      ..........................................................................................................................................  Patient Representative Print Name and Relationship to Patient    ..................................................               ................................................  Date                                   Time    ..........................................................................................................................................  Reviewed by Signature/Title    ...................................................              ..............................................  Date                                               Time          22EPIC Rev 08/18

## 2020-07-11 LAB
BACTERIA SPEC CULT: ABNORMAL
SPECIMEN SOURCE: ABNORMAL

## 2020-09-15 ENCOUNTER — HOSPITAL ENCOUNTER (EMERGENCY)
Facility: HOSPITAL | Age: 65
Discharge: HOME | End: 2020-09-15
Attending: NURSE PRACTITIONER | Admitting: NURSE PRACTITIONER
Payer: MEDICARE

## 2020-09-15 VITALS
OXYGEN SATURATION: 98 % | TEMPERATURE: 97 F | RESPIRATION RATE: 14 BRPM | DIASTOLIC BLOOD PRESSURE: 83 MMHG | SYSTOLIC BLOOD PRESSURE: 149 MMHG | HEART RATE: 104 BPM

## 2020-09-15 DIAGNOSIS — N30.01 ACUTE CYSTITIS WITH HEMATURIA: Primary | ICD-10-CM

## 2020-09-15 LAB
ALBUMIN UR-MCNC: 30 MG/DL
APPEARANCE UR: ABNORMAL
BACTERIA #/AREA URNS HPF: ABNORMAL /HPF
BILIRUB UR QL STRIP: NEGATIVE
COLOR UR AUTO: YELLOW
GLUCOSE UR STRIP-MCNC: NEGATIVE MG/DL
HGB UR QL STRIP: ABNORMAL
KETONES UR STRIP-MCNC: 10 MG/DL
LEUKOCYTE ESTERASE UR QL STRIP: ABNORMAL
MUCOUS THREADS #/AREA URNS LPF: PRESENT /LPF
NITRATE UR QL: NEGATIVE
PH UR STRIP: 5.5 PH (ref 4.7–8)
RBC #/AREA URNS AUTO: 30 /HPF (ref 0–2)
RENAL EPI CELLS #/AREA URNS HPF: 2 /HPF (ref 0–1)
SOURCE: ABNORMAL
SP GR UR STRIP: 1.02 (ref 1–1.03)
SQUAMOUS #/AREA URNS AUTO: 2 /HPF (ref 0–1)
UROBILINOGEN UR STRIP-MCNC: NORMAL MG/DL (ref 0–2)
WBC #/AREA URNS AUTO: >182 /HPF (ref 0–5)
WBC CLUMPS #/AREA URNS HPF: PRESENT /HPF

## 2020-09-15 PROCEDURE — 87086 URINE CULTURE/COLONY COUNT: CPT | Performed by: NURSE PRACTITIONER

## 2020-09-15 PROCEDURE — 81001 URINALYSIS AUTO W/SCOPE: CPT | Performed by: NURSE PRACTITIONER

## 2020-09-15 PROCEDURE — G0463 HOSPITAL OUTPT CLINIC VISIT: HCPCS

## 2020-09-15 PROCEDURE — 99213 OFFICE O/P EST LOW 20 MIN: CPT | Mod: Z6 | Performed by: NURSE PRACTITIONER

## 2020-09-15 RX ORDER — SULFAMETHOXAZOLE/TRIMETHOPRIM 800-160 MG
1 TABLET ORAL 2 TIMES DAILY
Qty: 10 TABLET | Refills: 0 | Status: SHIPPED | OUTPATIENT
Start: 2020-09-15 | End: 2020-09-20

## 2020-09-15 NOTE — ED TRIAGE NOTES
"Pt presents today with c/o possible UTI. Started \"weeks ago\". C/o pink urine, lower abdominal pain. Pt does self cath.   "

## 2020-09-15 NOTE — DISCHARGE INSTRUCTIONS
(N30.01) Acute cystitis with hematuria  (primary encounter diagnosis)  Comment: acute, symptomatic  Plan:   - START Bactrim one tablet twice daily x 5 days today.   Take entire course of antibiotic even if you start to feel better.  Antibiotics can cause stomach upset including nausea and diarrhea. Read your bottle or ask the pharmacist if antibiotic can be taken with food to help prevent nausea. If you have symptoms of diarrhea you can take an over-the-counter probiotic and/or increase foods with probiotics such as yogurt, [place], sauerkraut.  - Ensure you are drinking plenty of fluids, emptying your bladder when you feel the urge versus holding urine, after urinating you can bear-down to empty bladder completely, after peeing wipe front to back to avoid introducing bacteria towards vaginal area.             RETURN TO THE ED WITH NEW OR WORSENING SYMPTOMS.    FOLLOW-UP WITH YOUR PRIMARY CARE PROVIDER IN 7-10 DAYS IF NO IMPROVEMENT  Can speak with PCP if this urine culture also grows out bacteria resistant to macrobid prophylaxis.       Brabara Chaudhry, CNP      Results for orders placed or performed during the hospital encounter of 09/15/20   UA reflex to Microscopic and Culture     Status: Abnormal    Specimen: Catheterized Urine   Result Value Ref Range    Color Urine Yellow     Appearance Urine Slightly Cloudy     Glucose Urine Negative NEG^Negative mg/dL    Bilirubin Urine Negative NEG^Negative    Ketones Urine 10 (A) NEG^Negative mg/dL    Specific Gravity Urine 1.022 1.003 - 1.035    Blood Urine Small (A) NEG^Negative    pH Urine 5.5 4.7 - 8.0 pH    Protein Albumin Urine 30 (A) NEG^Negative mg/dL    Urobilinogen mg/dL Normal 0.0 - 2.0 mg/dL    Nitrite Urine Negative NEG^Negative    Leukocyte Esterase Urine Large (A) NEG^Negative    Source Catheterized Urine     RBC Urine 30 (H) 0 - 2 /HPF    WBC Urine >182 (H) 0 - 5 /HPF    WBC Clumps Present (A) NEG^Negative /HPF    Bacteria Urine Few (A) NEG^Negative /HPF     Squamous Epithelial /HPF Urine 2 (H) 0 - 1 /HPF    Renal Tub Epi 2 (H) 0 - 1 /HPF    Mucous Urine Present (A) NEG^Negative /LPF

## 2020-09-15 NOTE — ED PROVIDER NOTES
"  History     Chief Complaint   Patient presents with     UTI     c/o uti symptoms     HPI  Millie Gamez is a 65 year old female who presents ambulatory for evaluation of urinary tract infection. She has a history of being unable to void and self-catheterizes 4-5 times daily. She has a history of recurrent urinary tract infections, takes macrobid once daily at bedtime for prophylaxis (has been on this \"for years.\"). She has symptoms of 2/10 suprapubic discomfort that started a couple weeks ago, pink-tinged urine for a couple of days. Denies fever, chills, nausea/vomiting, flank pain, abdominal pain, vaginal burning/itching/discharge. She has increased fluids, cranberry juice without improvement in symptoms.     Allergies:  No Known Allergies    Problem List:    There are no active problems to display for this patient.       Past Medical History:    No past medical history on file.    Past Surgical History:    Past Surgical History:   Procedure Laterality Date     BREAST SURGERY       COLONOSCOPY N/A 10/15/2015    Procedure: COLONOSCOPY;  Surgeon: Murray Calderon MD;  Location: [facility]     GYN SURGERY       ORTHOPEDIC SURGERY       RELEASE CARPAL TUNNEL Left 2017    Procedure: RELEASE CARPAL TUNNEL;  CARPAL TUNNEL RELEASE LEFT;  Surgeon: Robert Lovell DO;  Location: [facility]       Family History:    No family history on file.    Social History:  Marital Status:   [5]  Social History     Tobacco Use     Smoking status: Former Smoker     Types: Cigarettes     Last attempt to quit: 1997     Years since quittin.7     Smokeless tobacco: Never Used   Substance Use Topics     Alcohol use: Yes     Comment: occation     Drug use: No        Medications:    sulfamethoxazole-trimethoprim (BACTRIM DS) 800-160 MG tablet  Cholecalciferol (VITAMIN D3) 2000 UNITS CAPS  ciclopirox 8 % SOLN  HYDROcodone-acetaminophen (NORCO) 5-325 MG per tablet  Multiple Vitamin (MULTIVITAMIN  S) CAPS  rizatriptan " (MAXALT-MLT) 10 MG ODT tab          Review of Systems   Constitutional: Negative for chills and fever.   Gastrointestinal: Positive for abdominal pain (suprapubic discomfort). Negative for constipation, diarrhea, nausea and vomiting.   Genitourinary: Positive for hematuria. Negative for flank pain, vaginal bleeding and vaginal discharge.   Neurological: Negative for dizziness and light-headedness.       Physical Exam   BP: 149/83  Pulse: 104  Temp: 97  F (36.1  C)  Resp: 14  SpO2: 98 %      Physical Exam  Constitutional:       General: She is not in acute distress.     Appearance: She is not ill-appearing, toxic-appearing or diaphoretic.   Cardiovascular:      Rate and Rhythm: Normal rate and regular rhythm.      Heart sounds: S1 normal and S2 normal. No murmur. No friction rub. No gallop.    Pulmonary:      Effort: Pulmonary effort is normal.      Breath sounds: Normal breath sounds.   Abdominal:      General: Bowel sounds are normal. There is no distension.      Palpations: Abdomen is soft.      Tenderness: There is abdominal tenderness in the suprapubic area. There is no right CVA tenderness, left CVA tenderness, guarding or rebound.   Skin:     General: Skin is warm and dry.      Capillary Refill: Capillary refill takes less than 2 seconds.      Coloration: Skin is not pale.   Neurological:      Mental Status: She is alert and oriented to person, place, and time.      Gait: Gait is intact.   Psychiatric:         Behavior: Behavior is cooperative.         ED Course        Procedures        Results for orders placed or performed during the hospital encounter of 09/15/20   UA reflex to Microscopic and Culture     Status: Abnormal    Specimen: Catheterized Urine   Result Value Ref Range    Color Urine Yellow     Appearance Urine Slightly Cloudy     Glucose Urine Negative NEG^Negative mg/dL    Bilirubin Urine Negative NEG^Negative    Ketones Urine 10 (A) NEG^Negative mg/dL    Specific Gravity Urine 1.022 1.003 -  1.035    Blood Urine Small (A) NEG^Negative    pH Urine 5.5 4.7 - 8.0 pH    Protein Albumin Urine 30 (A) NEG^Negative mg/dL    Urobilinogen mg/dL Normal 0.0 - 2.0 mg/dL    Nitrite Urine Negative NEG^Negative    Leukocyte Esterase Urine Large (A) NEG^Negative    Source Catheterized Urine     RBC Urine 30 (H) 0 - 2 /HPF    WBC Urine >182 (H) 0 - 5 /HPF    WBC Clumps Present (A) NEG^Negative /HPF    Bacteria Urine Few (A) NEG^Negative /HPF    Squamous Epithelial /HPF Urine 2 (H) 0 - 1 /HPF    Renal Tub Epi 2 (H) 0 - 1 /HPF    Mucous Urine Present (A) NEG^Negative /LPF   Urine Culture Aerobic Bacterial     Status: None    Specimen: Catheterized Urine   Result Value Ref Range    Specimen Description Catheterized Urine     Culture Micro No growth          No results found for this or any previous visit (from the past 24 hour(s)).    Medications - No data to display    Assessments & Plan (with Medical Decision Making)     I have reviewed the nursing notes.    I have reviewed the findings, diagnosis, plan and need for follow up with the patient.  (N30.01) Acute cystitis with hematuria  (primary encounter diagnosis)  Comment: acute, symptomatic  Plan:   - START Bactrim one tablet twice daily x 5 days today.   Take entire course of antibiotic even if you start to feel better.  Antibiotics can cause stomach upset including nausea and diarrhea. Read your bottle or ask the pharmacist if antibiotic can be taken with food to help prevent nausea. If you have symptoms of diarrhea you can take an over-the-counter probiotic and/or increase foods with probiotics such as yogurt, [place], sauerkraut.  - Ensure you are drinking plenty of fluids, emptying your bladder when you feel the urge versus holding urine, after urinating you can bear-down to empty bladder completely, after peeing wipe front to back to avoid introducing bacteria towards vaginal area.             RETURN TO THE ED WITH NEW OR WORSENING SYMPTOMS.    FOLLOW-UP WITH YOUR  PRIMARY CARE PROVIDER IN 7-10 DAYS IF NO IMPROVEMENT  Can speak with PCP if this urine culture also grows out bacteria resistant to macrobid prophylaxis.       Barbara Chaudhry CNP    Discharge Medication List as of 9/15/2020  1:23 PM      START taking these medications    Details   sulfamethoxazole-trimethoprim (BACTRIM DS) 800-160 MG tablet Take 1 tablet by mouth 2 times daily for 5 days, Disp-10 tablet,R-0, E-Prescribe             Final diagnoses:   Acute cystitis with hematuria       9/15/2020   [facility]     Barbara Chaudhry CNP  09/18/20 8348

## 2020-09-15 NOTE — ED AVS SNAPSHOT
[facility]  [address] [address]  Phone:  [phone]                                    Millie Gamez   MRN: [id number]    Department:  [facility]   Date of Visit:  9/15/2020           After Visit Summary Signature Page    I have received my discharge instructions, and my questions have been answered. I have discussed any challenges I see with this plan with the nurse or doctor.    ..........................................................................................................................................  Patient/Patient Representative Signature      ..........................................................................................................................................  Patient Representative Print Name and Relationship to Patient    ..................................................               ................................................  Date                                   Time    ..........................................................................................................................................  Reviewed by Signature/Title    ...................................................              ..............................................  Date                                               Time          22EPIC Rev 08/18

## 2020-09-17 LAB
BACTERIA SPEC CULT: NO GROWTH
SPECIMEN SOURCE: NORMAL

## 2020-09-17 NOTE — ED PROVIDER NOTES
Per phone call: she states she was itching and her body turned red after she took her first dose of Bactrim. She had called her pharmacist and informed to take benadryl, which she did. Her symptoms resolved.   Informed her, her urine culture was negative. She states her symptoms have improved. At this time, she will continue to observe her symptoms and will follow-up with PCP. She is taking cranberry juice and will increase her fluids.  Discussed possibility of vaginal atrophy and need for estrogen or other lubricating substance and could follow-up with OB/GYN if her symptoms continue,     Jyoti Mulligan, CNP  09/17/20 0974

## 2020-09-18 ASSESSMENT — ENCOUNTER SYMPTOMS
ABDOMINAL PAIN: 1
FLANK PAIN: 0
HEMATURIA: 1
LIGHT-HEADEDNESS: 0
NAUSEA: 0
VOMITING: 0
FEVER: 0
CONSTIPATION: 0
CHILLS: 0
DIARRHEA: 0
DIZZINESS: 0

## 2021-05-26 ENCOUNTER — RECORDS - HEALTHEAST (OUTPATIENT)
Dept: ADMINISTRATIVE | Facility: CLINIC | Age: 66
End: 2021-05-26

## 2021-05-30 ENCOUNTER — RECORDS - HEALTHEAST (OUTPATIENT)
Dept: ADMINISTRATIVE | Facility: CLINIC | Age: 66
End: 2021-05-30

## 2021-05-31 ENCOUNTER — RECORDS - HEALTHEAST (OUTPATIENT)
Dept: ADMINISTRATIVE | Facility: CLINIC | Age: 66
End: 2021-05-31

## 2021-06-02 ENCOUNTER — RECORDS - HEALTHEAST (OUTPATIENT)
Dept: ADMINISTRATIVE | Facility: CLINIC | Age: 66
End: 2021-06-02

## 2021-07-13 ENCOUNTER — RECORDS - HEALTHEAST (OUTPATIENT)
Dept: ADMINISTRATIVE | Facility: CLINIC | Age: 66
End: 2021-07-13

## 2022-05-14 ENCOUNTER — HOSPITAL ENCOUNTER (EMERGENCY)
Facility: HOSPITAL | Age: 67
Discharge: HOME | End: 2022-05-14
Attending: PHYSICIAN ASSISTANT | Admitting: PHYSICIAN ASSISTANT
Payer: MEDICARE

## 2022-05-14 VITALS
DIASTOLIC BLOOD PRESSURE: 90 MMHG | HEART RATE: 102 BPM | SYSTOLIC BLOOD PRESSURE: 162 MMHG | RESPIRATION RATE: 16 BRPM | OXYGEN SATURATION: 97 % | TEMPERATURE: 98 F

## 2022-05-14 DIAGNOSIS — N39.0 URINARY TRACT INFECTION WITH HEMATURIA, SITE UNSPECIFIED: ICD-10-CM

## 2022-05-14 DIAGNOSIS — R31.9 URINARY TRACT INFECTION WITH HEMATURIA, SITE UNSPECIFIED: ICD-10-CM

## 2022-05-14 LAB
ALBUMIN UR-MCNC: 30 MG/DL
APPEARANCE UR: ABNORMAL
BACTERIA #/AREA URNS HPF: ABNORMAL /HPF
BILIRUB UR QL STRIP: NEGATIVE
COLOR UR AUTO: YELLOW
GLUCOSE UR STRIP-MCNC: NEGATIVE MG/DL
HGB UR QL STRIP: ABNORMAL
KETONES UR STRIP-MCNC: NEGATIVE MG/DL
LEUKOCYTE ESTERASE UR QL STRIP: ABNORMAL
MUCOUS THREADS #/AREA URNS LPF: PRESENT /LPF
NITRATE UR QL: NEGATIVE
PH UR STRIP: 5.5 [PH] (ref 4.7–8)
RBC URINE: 17 /HPF
SP GR UR STRIP: 1.02 (ref 1–1.03)
SQUAMOUS EPITHELIAL: 15 /HPF
TRANSITIONAL EPI: <1 /HPF
UROBILINOGEN UR STRIP-MCNC: NORMAL MG/DL
WBC URINE: >182 /HPF

## 2022-05-14 PROCEDURE — G0463 HOSPITAL OUTPT CLINIC VISIT: HCPCS

## 2022-05-14 PROCEDURE — 87086 URINE CULTURE/COLONY COUNT: CPT | Performed by: PHYSICIAN ASSISTANT

## 2022-05-14 PROCEDURE — 81001 URINALYSIS AUTO W/SCOPE: CPT | Performed by: PHYSICIAN ASSISTANT

## 2022-05-14 PROCEDURE — 99213 OFFICE O/P EST LOW 20 MIN: CPT | Performed by: PHYSICIAN ASSISTANT

## 2022-05-14 RX ORDER — NITROFURANTOIN 25; 75 MG/1; MG/1
100 CAPSULE ORAL 2 TIMES DAILY
Qty: 14 CAPSULE | Refills: 0 | Status: SHIPPED | OUTPATIENT
Start: 2022-05-14 | End: 2024-08-16

## 2022-05-14 ASSESSMENT — ENCOUNTER SYMPTOMS
FLANK PAIN: 0
FATIGUE: 1
FEVER: 0
RESPIRATORY NEGATIVE: 1
DIFFICULTY URINATING: 1
CARDIOVASCULAR NEGATIVE: 1

## 2022-05-14 NOTE — ED PROVIDER NOTES
History     Chief Complaint   Patient presents with     foul smelling urine     HPI  Millie Gamez is a 67 year old female who self-cath's with concern for developing UTI. She reports onset of dark urine over the past few weeks, foul odor over the past several days. She notes pink tinge to urine in AM. Hx kidney stones in distant past, but denies flank/back/pelvic pain. No fevers.     Allergies:  No Known Allergies    Problem List:    Patient Active Problem List    Diagnosis Date Noted     Osteoporosis      Priority: Medium     Created by Conversion  Replacement Utility updated for latest IMO load         Breast Cancer      Priority: Medium     Created by Conversion  [facility] Annotation: Oct  8 2007  4:49PM - Zonia Horton: Dr. Soliman   is oncologist  Replacement Utility updated for latest IMO load         Imaging Studies Nonspecific Abnormal Findings Breast      Priority: Medium     Created by Conversion  Replacement Utility updated for latest IMO load         Ptosis Of Eyelid      Priority: Medium     Created by Conversion  Replacement Utility updated for latest IMO load         Osteoporosis Senile      Priority: Medium     Created by Conversion         Neck Pain      Priority: Medium     Created by Conversion         Trochanteric Bursitis      Priority: Medium     Created by Conversion         Atony Of The Bladder      Priority: Medium     Created by Conversion         Lymphedema      Priority: Medium     Created by Conversion  [facility] Annotation: Nov 12 2008  1:36PM - Zonia Horton: right arm         Acute Cystitis      Priority: Medium     Created by Conversion  [facility] Annotation: Nov 12 2008  1:14PM - Zonia Horton: recurrent            Past Medical History:    History reviewed. No pertinent past medical history.    Past Surgical History:    Past Surgical History:   Procedure Laterality Date     BREAST SURGERY       COLONOSCOPY N/A 10/15/2015    Procedure: COLONOSCOPY;  Surgeon:  Murray Calderon MD;  Location: [facility] OR     GYN SURGERY       HYSTERECTOMY       MASTECTOMY Right 2003     OOPHORECTOMY       ORTHOPEDIC SURGERY       AK OPEN RX DISTAL RADIUS FX, INTRA-ARTICULAR, 2 FRAG      Description: Open Treat Distal Radius Fx W/ Internal Fixation 2 Fragments;  Recorded: 2008;  Comments: as a child     RELEASE CARPAL TUNNEL Left 2017    Procedure: RELEASE CARPAL TUNNEL;  CARPAL TUNNEL RELEASE LEFT;  Surgeon: Robert Lovell DO;  Location: [facility] OR     [facility] LIGATE FALLOPIAN TUBE      Description: Tubal Ligation;  Recorded: 2008;       Family History:    Family History   Problem Relation Age of Onset     Cancer Mother 80.00        lung     Cancer Father 85.00        Prostate     No Known Problems Sister      No Known Problems Daughter      No Known Problems Maternal Grandmother      No Known Problems Maternal Grandfather      No Known Problems Paternal Grandmother      No Known Problems Paternal Grandfather      No Known Problems Maternal Aunt      Breast Cancer Paternal Aunt 30.00     Hereditary Breast and Ovarian Cancer Syndrome No family hx of      Colon Cancer No family hx of      Endometrial Cancer No family hx of      Ovarian Cancer No family hx of      Breast Cancer Paternal Aunt 70.00       Social History:  Marital Status:   [5]  Social History     Tobacco Use     Smoking status: Former Smoker     Types: Cigarettes     Quit date: 1997     Years since quittin.3     Smokeless tobacco: Never Used   Substance Use Topics     Alcohol use: Yes     Comment: occation     Drug use: No        Medications:    Cholecalciferol (VITAMIN D3) 2000 UNITS CAPS  ciclopirox 8 % SOLN  HYDROcodone-acetaminophen (NORCO) 5-325 MG per tablet  Multiple Vitamin (MULTIVITAMIN  S) CAPS  nitroFURantoin macrocrystal-monohydrate (MACROBID) 100 MG capsule  rizatriptan (MAXALT-MLT) 10 MG ODT tab          Review of Systems   Constitutional: Positive for fatigue. Negative for fever.    Respiratory: Negative.    Cardiovascular: Negative.    Genitourinary: Positive for difficulty urinating (baseline). Negative for flank pain, pelvic pain and vaginal bleeding.        Foul, dark urine   Skin: Negative for rash.       Physical Exam   BP: 162/90  Pulse: 102  Temp: 98  F (36.7  C)  Resp: 16  SpO2: 97 %      Physical Exam  Vitals and nursing note reviewed.   Constitutional:       General: She is not in acute distress.     Appearance: She is not toxic-appearing.   Cardiovascular:      Rate and Rhythm: Normal rate.   Pulmonary:      Effort: Pulmonary effort is normal.   Abdominal:      General: Abdomen is flat.      Tenderness: There is no right CVA tenderness or left CVA tenderness.   Neurological:      Mental Status: She is alert.         ED Course     Results for orders placed or performed during the hospital encounter of 05/14/22 (from the past 24 hour(s))   UA reflex to Microscopic   Result Value Ref Range    Color Urine Yellow Colorless, Straw, Light Yellow, Yellow    Appearance Urine Slightly Cloudy (A) Clear    Glucose Urine Negative Negative mg/dL    Bilirubin Urine Negative Negative    Ketones Urine Negative Negative mg/dL    Specific Gravity Urine 1.022 1.003 - 1.035    Blood Urine Trace (A) Negative    pH Urine 5.5 4.7 - 8.0    Protein Albumin Urine 30  (A) Negative mg/dL    Urobilinogen Urine Normal Normal, 2.0 mg/dL    Nitrite Urine Negative Negative    Leukocyte Esterase Urine Large (A) Negative    Bacteria Urine Many (A) None Seen /HPF    RBC Urine 17 (H) <=2 /HPF    WBC Urine >182 (H) <=5 /HPF    Squamous Epithelials Urine 15 (H) <=1 /HPF    Mucus Urine Present (A) None Seen /LPF    Transitional Epithelials Urine <1 <=1 /HPF     Medications - No data to display    Assessments & Plan (with Medical Decision Making)     I have reviewed the nursing notes.  I have reviewed the findings, diagnosis, plan and need for follow up with the patient.  New Prescriptions    NITROFURANTOIN  MACROCRYSTAL-MONOHYDRATE (MACROBID) 100 MG CAPSULE    Take 1 capsule (100 mg) by mouth 2 times daily       Final diagnoses:   Urinary tract infection with hematuria, site unspecified   Discussed Tx and patient prefers antibiotic, which is reasonable with her Hx. Macrobid, fluids. Follow up with Primary Care Provider in 3 days with poor improvement. Seek attention sooner with worsening despite treatment.      5/14/2022   [facility]     Jose Shaffer, PA  05/14/22 [id number]

## 2022-05-14 NOTE — DISCHARGE INSTRUCTIONS
Macrobid for 7 days  Fluids  Back here with fevers, worsening, pains like you have kidney stones (blood may persist, but infection should not).

## 2022-05-14 NOTE — ED TRIAGE NOTES
"Pt self caths. States her urine in the morning has been very smelly and \"pink sometimes\". Ran any any flank pain or fevers. C/o some low back pain.      "

## 2022-05-14 NOTE — ED TRIAGE NOTES
Onset 3-4 weeks now with cloudy urine and pink in am and foul smelling denies flank or abdominal pain. Patient self caths

## 2022-05-16 LAB — BACTERIA UR CULT: ABNORMAL

## 2024-08-16 ENCOUNTER — HOSPITAL ENCOUNTER (EMERGENCY)
Facility: HOSPITAL | Age: 69
Discharge: HOME | End: 2024-08-16
Attending: NURSE PRACTITIONER | Admitting: NURSE PRACTITIONER
Payer: COMMERCIAL

## 2024-08-16 VITALS
OXYGEN SATURATION: 97 % | DIASTOLIC BLOOD PRESSURE: 70 MMHG | HEART RATE: 103 BPM | BODY MASS INDEX: 20.83 KG/M2 | TEMPERATURE: 98.6 F | RESPIRATION RATE: 16 BRPM | SYSTOLIC BLOOD PRESSURE: 133 MMHG | WEIGHT: 125 LBS | HEIGHT: 65 IN

## 2024-08-16 DIAGNOSIS — Z78.9 SELF-CATHETERIZES URINARY BLADDER: ICD-10-CM

## 2024-08-16 DIAGNOSIS — N30.01 ACUTE CYSTITIS WITH HEMATURIA: Primary | ICD-10-CM

## 2024-08-16 LAB
ALBUMIN UR-MCNC: NEGATIVE MG/DL
APPEARANCE UR: ABNORMAL
BACTERIA #/AREA URNS HPF: ABNORMAL /HPF
BILIRUB UR QL STRIP: NEGATIVE
COLOR UR AUTO: ABNORMAL
GLUCOSE UR STRIP-MCNC: NEGATIVE MG/DL
HGB UR QL STRIP: ABNORMAL
KETONES UR STRIP-MCNC: NEGATIVE MG/DL
LEUKOCYTE ESTERASE UR QL STRIP: ABNORMAL
MUCOUS THREADS #/AREA URNS LPF: PRESENT /LPF
NITRATE UR QL: NEGATIVE
PH UR STRIP: 5.5 [PH] (ref 4.7–8)
RBC URINE: 5 /HPF
SP GR UR STRIP: 1 (ref 1–1.03)
SQUAMOUS EPITHELIAL: 0 /HPF
UROBILINOGEN UR STRIP-MCNC: NORMAL MG/DL
WBC CLUMPS #/AREA URNS HPF: PRESENT /HPF
WBC URINE: >182 /HPF

## 2024-08-16 PROCEDURE — 99213 OFFICE O/P EST LOW 20 MIN: CPT | Performed by: NURSE PRACTITIONER

## 2024-08-16 PROCEDURE — 87086 URINE CULTURE/COLONY COUNT: CPT | Performed by: NURSE PRACTITIONER

## 2024-08-16 PROCEDURE — 81001 URINALYSIS AUTO W/SCOPE: CPT | Performed by: NURSE PRACTITIONER

## 2024-08-16 PROCEDURE — 87186 SC STD MICRODIL/AGAR DIL: CPT | Performed by: NURSE PRACTITIONER

## 2024-08-16 PROCEDURE — G0463 HOSPITAL OUTPT CLINIC VISIT: HCPCS

## 2024-08-16 RX ORDER — MELOXICAM 15 MG/1
15 TABLET ORAL DAILY
COMMUNITY
Start: 2024-08-05

## 2024-08-16 RX ORDER — ACETAMINOPHEN 325 MG/1
325-650 TABLET ORAL
COMMUNITY

## 2024-08-16 RX ORDER — NITROFURANTOIN 25; 75 MG/1; MG/1
100 CAPSULE ORAL 2 TIMES DAILY
Qty: 14 CAPSULE | Refills: 0 | Status: SHIPPED | OUTPATIENT
Start: 2024-08-16 | End: 2024-08-23

## 2024-08-16 ASSESSMENT — ENCOUNTER SYMPTOMS
HEMATURIA: 0
FLANK PAIN: 0
FEVER: 0
DYSURIA: 1
CHILLS: 0

## 2024-08-16 NOTE — ED PROVIDER NOTES
History     Chief Complaint   Patient presents with    Dysuria     HPI  Millie Gamez is a 69 year old female who presents ambulatory to urgent care with concerns of a urinary tract infection.  The show reports foul-smelling urine, cloudy urine and slight dysuria.  No hematuria, flank pain or abdominal pain.  No fever or chills.  Symptoms started yesterday.  Patient tells me that she self caths.  She used to get frequent UTIs until she stopped taking baths.  Reports that she was in a hot tub over the past weekend and thinks that this is what started it.  No other concerns at this time.    Allergies:  No Known Allergies    Problem List:    Patient Active Problem List    Diagnosis Date Noted    Osteoporosis      Priority: Medium     Created by Conversion  Replacement Utility updated for latest IMO load        Breast Cancer      Priority: Medium     Created by Conversion  [facility] Annotation: Oct  8 2007  4:49PM - Zonia Horton: Dr. Soliman   is oncologist  Replacement Utility updated for latest IMO load        Imaging Studies Nonspecific Abnormal Findings Breast      Priority: Medium     Created by Conversion  Replacement Utility updated for latest IMO load        Ptosis Of Eyelid      Priority: Medium     Created by Conversion  Replacement Utility updated for latest IMO load        Osteoporosis Senile      Priority: Medium     Created by Conversion        Neck Pain      Priority: Medium     Created by Conversion        Trochanteric Bursitis      Priority: Medium     Created by Conversion        Atony Of The Bladder      Priority: Medium     Created by Conversion        Lymphedema      Priority: Medium     Created by Conversion  [facility] Annotation: Nov 12 2008  1:36PM - Zonia Horton: right arm        Acute Cystitis      Priority: Medium     Created by Conversion  [facility] Annotation: Nov 12 2008  1:14PM - Zonia Horton: recurrent            Past Medical History:    History reviewed. No  pertinent past medical history.    Past Surgical History:    Past Surgical History:   Procedure Laterality Date    BREAST SURGERY      COLONOSCOPY N/A 10/15/2015    Procedure: COLONOSCOPY;  Surgeon: Murray Calderon MD;  Location: [facility]    GYN SURGERY      HYSTERECTOMY      MASTECTOMY Right 2003    OOPHORECTOMY      ORTHOPEDIC SURGERY      [place] OPEN RX DISTAL RADIUS FX, INTRA-ARTICULAR, 2 FRAG      Description: Open Treat Distal Radius Fx W/ Internal Fixation 2 Fragments;  Recorded: 2008;  Comments: as a child    RELEASE CARPAL TUNNEL Left 2017    Procedure: RELEASE CARPAL TUNNEL;  CARPAL TUNNEL RELEASE LEFT;  Surgeon: Robert Lovell DO;  Location: [facility] OR    [facility] LIGATE FALLOPIAN TUBE      Description: Tubal Ligation;  Recorded: 2008;       Family History:    Family History   Problem Relation Age of Onset    Cancer Mother 80.00        lung    Cancer Father 85.00        Prostate    No Known Problems Sister     No Known Problems Daughter     No Known Problems Maternal Grandmother     No Known Problems Maternal Grandfather     No Known Problems Paternal Grandmother     No Known Problems Paternal Grandfather     No Known Problems Maternal Aunt     Breast Cancer Paternal Aunt 30.00    Hereditary Breast and Ovarian Cancer Syndrome No family hx of     Colon Cancer No family hx of     Endometrial Cancer No family hx of     Ovarian Cancer No family hx of     Breast Cancer Paternal Aunt 70.00       Social History:  Marital Status:   [5]  Social History     Tobacco Use    Smoking status: Former     Current packs/day: 0.00     Types: Cigarettes     Quit date: 1997     Years since quittin.6    Smokeless tobacco: Never   Substance Use Topics    Alcohol use: Yes     Comment: occation    Drug use: No        Medications:    meloxicam (MOBIC) 15 MG tablet  nitroFURantoin macrocrystal-monohydrate (MACROBID) 100 MG capsule  acetaminophen (TYLENOL) 325 MG tablet  Cholecalciferol (VITAMIN D3) 2000 UNITS  "CAPS  ciclopirox 8 % SOLN  HYDROcodone-acetaminophen (NORCO) 5-325 MG per tablet  Multiple Vitamin (MULTIVITAMIN  S) CAPS  rizatriptan (MAXALT-MLT) 10 MG ODT tab          Review of Systems   Constitutional:  Negative for chills and fever.   Genitourinary:  Positive for dysuria. Negative for flank pain, hematuria, urgency and vaginal discharge.        Foul-smelling urine   All other systems reviewed and are negative.      Physical Exam   BP: 133/70  Pulse: 103  Temp: 98.6  F (37  C)  Resp: 16  Height: 165.1 cm (5' 5\")  Weight: 56.7 kg (125 lb)  SpO2: 97 %      Physical Exam  Vitals and nursing note reviewed.   Constitutional:       Appearance: Normal appearance. She is not ill-appearing or toxic-appearing.   HENT:      Head: Atraumatic.   Cardiovascular:      Rate and Rhythm: Normal rate and regular rhythm.      Heart sounds: Normal heart sounds.   Pulmonary:      Effort: Pulmonary effort is normal. No respiratory distress.      Breath sounds: Normal breath sounds. No rhonchi.   Abdominal:      General: Bowel sounds are normal.      Palpations: Abdomen is soft.      Tenderness: There is no abdominal tenderness. There is no right CVA tenderness, left CVA tenderness, guarding or rebound.   Genitourinary:     Comments: Deferred  Musculoskeletal:      Cervical back: Neck supple.   Skin:     Coloration: Skin is not pale.   Neurological:      Mental Status: She is alert and oriented to person, place, and time.         ED Course        Procedures              Results for orders placed or performed during the hospital encounter of 08/16/24 (from the past 24 hour(s))   UA with Microscopic reflex to Culture    Specimen: Urine, Clean Catch   Result Value Ref Range    Color Urine Light Yellow Colorless, Straw, Light Yellow, Yellow    Appearance Urine Slightly Cloudy (A) Clear    Glucose Urine Negative Negative mg/dL    Bilirubin Urine Negative Negative    Ketones Urine Negative Negative mg/dL    Specific Gravity Urine 1.005 " 1.003 - 1.035    Blood Urine Small (A) Negative    pH Urine 5.5 4.7 - 8.0    Protein Albumin Urine Negative Negative mg/dL    Urobilinogen Urine Normal Normal, 2.0 mg/dL    Nitrite Urine Negative Negative    Leukocyte Esterase Urine Large (A) Negative    Bacteria Urine Few (A) None Seen /HPF    WBC Clumps Urine Present (A) None Seen /HPF    Mucus Urine Present (A) None Seen /LPF    RBC Urine 5 (H) <=2 /HPF    WBC Urine >182 (H) <=5 /HPF    Squamous Epithelials Urine 0 <=1 /HPF    Narrative    Urine Culture ordered based on laboratory criteria       Medications - No data to display    Assessments & Plan (with Medical Decision Making)   69-year-old female with UTI symptoms since yesterday.  History of self-catheterization.  Soft and nontender abdomen palpation.  No CVA tenderness.  Afebrile.  Urinalysis shows signs of infection.  Urine culture is pending.  Patient will be treated with Macrobid as prescribed.  Follow-up with primary doctor as needed.  Return to urgent care or emergency department for any worsening or concerning symptoms.    I have reviewed the nursing notes.    I have reviewed the findings, diagnosis, plan and need for follow up with the patient.  This document was prepared using a combination of typing and voice generated software.  While every attempt was made for accuracy, spelling and grammatical errors may exist.         New Prescriptions    NITROFURANTOIN MACROCRYSTAL-MONOHYDRATE (MACROBID) 100 MG CAPSULE    Take 1 capsule (100 mg) by mouth 2 times daily for 7 days       Final diagnoses:   Acute cystitis with hematuria   Self-catheterizes urinary bladder       8/16/2024   [facility]       Mpofu, Prudence, CNP  08/16/24 [id number]

## 2024-08-16 NOTE — DISCHARGE INSTRUCTIONS
You have a urinary tract infection which will be treated with antibiotic prescribed today.    Follow-up with your primary doctor as needed.  Return to urgent care emergency room for any worsening or concerning symptoms.

## 2024-08-18 LAB — BACTERIA UR CULT: ABNORMAL

## (undated) DEVICE — TOWEL-OR DISP 4PKS

## (undated) DEVICE — LIGHT HANDLE COVER

## (undated) DEVICE — DRAPE-STERI 45X60CM #1010

## (undated) DEVICE — NDL-25G 1-1/2" NON-SAFETY

## (undated) DEVICE — BLADE-SCALPEL #15

## (undated) DEVICE — BDG-ESMARK 4 INCH X 9 FT

## (undated) DEVICE — SUTURE-MONOCRYL 4-0 PS-2 Y496G

## (undated) DEVICE — DRAPE-THREE QUARTER (LARGE) SHEET

## (undated) DEVICE — STERI-STRIP-1/2" X 4"

## (undated) DEVICE — DRSG-NON-ADHERING 3 X 3 ADAPTIC

## (undated) DEVICE — CAST PADDING-STERILE 3"

## (undated) DEVICE — DRSG-KERLIX 6 X 6 3/4 FLUFF

## (undated) DEVICE — APPLICATOR-CHLORAPREP 26ML TINTED CHG 2%+ 70% IPA-SURGICAL

## (undated) DEVICE — LABEL-STERILE PREPRINTED FOR OR

## (undated) DEVICE — CUFF-DISP STERILE 18IN DBL BLADDER

## (undated) DEVICE — IRRIGATION-NACL 1000ML

## (undated) DEVICE — BDG-ELASTIC 2 INCH

## (undated) DEVICE — IRRIGATION-H2O 1000ML

## (undated) DEVICE — GOWN-SURG XL LVL 3 REINFORCED

## (undated) DEVICE — PACK-SET UP-CUSTOM

## (undated) DEVICE — GLV-8.5 PROTEXIS PI CLASSIC LF/PF

## (undated) DEVICE — DRAPE-EXTREMITY SHEET

## (undated) RX ORDER — PROPOFOL 10 MG/ML
INJECTION, EMULSION INTRAVENOUS
Status: DISPENSED
Start: 2017-09-29

## (undated) RX ORDER — KETAMINE HCL IN 0.9 % NACL 20 MG/2 ML
SYRINGE (ML) INTRAVENOUS
Status: DISPENSED
Start: 2017-09-29

## (undated) RX ORDER — FENTANYL CITRATE 50 UG/ML
INJECTION, SOLUTION INTRAMUSCULAR; INTRAVENOUS
Status: DISPENSED
Start: 2017-09-29